# Patient Record
Sex: FEMALE | Race: BLACK OR AFRICAN AMERICAN | Employment: FULL TIME | URBAN - METROPOLITAN AREA
[De-identification: names, ages, dates, MRNs, and addresses within clinical notes are randomized per-mention and may not be internally consistent; named-entity substitution may affect disease eponyms.]

---

## 2017-01-19 ENCOUNTER — OFFICE VISIT (OUTPATIENT)
Dept: FAMILY MEDICINE CLINIC | Facility: CLINIC | Age: 32
End: 2017-01-19

## 2017-01-19 VITALS
SYSTOLIC BLOOD PRESSURE: 126 MMHG | BODY MASS INDEX: 25.83 KG/M2 | TEMPERATURE: 96.8 F | RESPIRATION RATE: 14 BRPM | HEART RATE: 92 BPM | WEIGHT: 155 LBS | DIASTOLIC BLOOD PRESSURE: 93 MMHG | HEIGHT: 65 IN

## 2017-01-19 DIAGNOSIS — J06.9 VIRAL UPPER RESPIRATORY TRACT INFECTION: Primary | ICD-10-CM

## 2017-01-19 DIAGNOSIS — R05.9 COUGH: ICD-10-CM

## 2017-01-19 RX ORDER — PSEUDOEPHEDRINE HCL 120 MG/1
120 TABLET, FILM COATED, EXTENDED RELEASE ORAL 2 TIMES DAILY
Qty: 14 TAB | Refills: 0 | Status: SHIPPED | OUTPATIENT
Start: 2017-01-19 | End: 2017-01-26

## 2017-01-19 NOTE — PROGRESS NOTES
HISTORY OF PRESENT ILLNESS  Mayra Mcclure is a 33 y.o. female. HPI Comments: Acute care visit with c/o coryza and cough x 1 week. Reports ill contacts. She has a h/o allergic rhinitis and has used her flonase and zyrtec at home. She has also tried some supportive treatment at home which seemed to help but she feels like she is getting worse. Planning some out of town trips in the next 2 weeks and wanted to see if she needs to be treated today. Nasal Congestion    The history is provided by the patient. This is a new problem. The current episode started more than 1 week ago. The problem has not changed since onset. There has been no fever. The patient is experiencing no pain. Associated symptoms include sinus pressure, cough and rhinorrhea. Pertinent negatives include no chills and no sore throat. She has tried drinking and nasal steroids for the symptoms. The treatment provided mild relief. Review of Systems   Constitutional: Negative for chills. HENT: Positive for rhinorrhea and sinus pressure. Negative for sore throat. Respiratory: Positive for cough and sputum production. Cardiovascular: Negative. Genitourinary: Negative. Past Medical History   Diagnosis Date    Asthma     Gallbladder polyp 2011    Migraine      Past Surgical History   Procedure Laterality Date    Hx acl reconstruction       Dr Ocampo Narrow Hx  section       Current Outpatient Prescriptions on File Prior to Visit   Medication Sig Dispense Refill    fluticasone (FLONASE) 50 mcg/actuation nasal spray 2 Sprays by Both Nostrils route daily. Indications: ALLERGIC RHINITIS 3 Bottle 3    hydrocortisone (CORTAID) 1 % topical cream Apply  to affected area two (2) times daily as needed for Skin Irritation. 85.2 g 3    Cetirizine (ZYRTEC) 10 mg cap Take  by mouth.  levonorgestrel (MIRENA) 20 mcg/24 hour (5 years) IUD 1 Each by IntraUTERine route once.        No current facility-administered medications on file prior to visit. Allergies and Intolerances: Allergies   Allergen Reactions    Tylox [Oxycodone-Acetaminophen] Itching       Family History:   Family History   Problem Relation Age of Onset    Asthma Mother     Thyroid Disease Mother     Diabetes Father     Hypertension Father     Headache Father     Migraines Father     Bipolar Disorder Father     Psychiatric Disorder Father     Diabetes Paternal Grandmother     Alcohol abuse Paternal Grandfather        Social History:   She  reports that she has never smoked. She has never used smokeless tobacco. She  reports that she drinks alcohol. Vitals:   Visit Vitals    BP (!) 126/93 (BP 1 Location: Left arm, BP Patient Position: Sitting)    Pulse 92    Temp 96.8 °F (36 °C) (Oral)    Resp 14    Ht 5' 5\" (1.651 m)    Wt 155 lb (70.3 kg)    BMI 25.79 kg/m2     Body surface area is 1.8 meters squared. Physical Exam   Constitutional: She is oriented to person, place, and time. She appears well-developed and well-nourished. HENT:   Head: Atraumatic. Cardiovascular: Normal rate. Pulmonary/Chest: Effort normal and breath sounds normal. No respiratory distress. She has no wheezes. Neurological: She is alert and oriented to person, place, and time. Skin: Skin is warm. Psychiatric: She has a normal mood and affect. Her behavior is normal.   Nursing note and vitals reviewed. ASSESSMENT and PLAN    ICD-10-CM ICD-9-CM    1. Viral upper respiratory tract infection J06.9 465.9 pseudoephedrine CR (SUDAFED 12 HOUR) 120 mg CR tablet    B97.89      saline rinses, continue Flonase and Zyrtec. 2. Cough R05 786.2 pseudoephedrine CR (SUDAFED 12 HOUR) 120 mg CR tablet     Follow-up Disposition:  Return if symptoms worsen or fail to improve. reviewed medications and side effects in detail    - Alarm signals discussed. ER precautions  - Plan of care reviewed with patient.   Understanding verbalized and they are in agreement with plan of care.

## 2017-01-19 NOTE — MR AVS SNAPSHOT
Visit Information Date & Time Provider Department Dept. Phone Encounter #  
 1/19/2017  9:00 AM Memory Kortney, MARITZA [employer] [phone] [id number] Follow-up Instructions Return if symptoms worsen or fail to improve. Upcoming Health Maintenance Date Due  
 PAP AKA CERVICAL CYTOLOGY 2/14/2015 INFLUENZA AGE 9 TO ADULT 8/1/2016 DTaP/Tdap/Td series (2 - Td) 8/21/2022 Allergies as of 1/19/2017  Review Complete On: 1/19/2017 By: Renae Allred LPN Severity Noted Reaction Type Reactions Tylox [Oxycodone-acetaminophen] Medium 08/11/2012   Systemic Itching Current Immunizations  Reviewed on 1/11/2016 Name Date Influenza Vaccine 9/26/2013 Tdap 8/21/2012 Not reviewed this visit You Were Diagnosed With   
  
 Codes Comments Viral upper respiratory tract infection    -  Primary ICD-10-CM: J06.9, B97.89 ICD-9-CM: 465.9 Cough     ICD-10-CM: R05 ICD-9-CM: 197. 2 Vitals BP Pulse Temp Resp Height(growth percentile) Weight(growth percentile) (!) 126/93 (BP 1 Location: Left arm, BP Patient Position: Sitting) 92 96.8 °F (36 °C) (Oral) 14 5' 5\" (1.651 m) 155 lb (70.3 kg) BMI OB Status Smoking Status 25.79 kg/m2 IUD Never Smoker Vitals History BMI and BSA Data Body Mass Index Body Surface Area 25.79 kg/m 2 1.8 m 2 Preferred Pharmacy Pharmacy Name Phone [address], [place] [address] [id number] [facility] [phone] Your Updated Medication List  
  
   
This list is accurate as of: 1/19/17  9:23 AM.  Always use your most recent med list.  
  
  
  
  
 fluticasone 50 mcg/actuation nasal spray Commonly known as:  Domnick Means 2 Sprays by Both Nostrils route daily.  Indications: ALLERGIC RHINITIS  
  
 hydrocortisone 1 % topical cream  
Commonly known as:  CORTAID  
 Apply  to affected area two (2) times daily as needed for Skin Irritation. MIRENA 20 mcg/24 hr (5 years) IUD Generic drug:  levonorgestrel 1 Each by IntraUTERine route once. pseudoephedrine  mg CR tablet Commonly known as:  SUDAFED 12 HOUR Take 1 Tab by mouth two (2) times a day for 7 days. ZyrTEC 10 mg Cap Generic drug:  Cetirizine Take  by mouth. Prescriptions Sent to Pharmacy Refills  
 pseudoephedrine CR (SUDAFED 12 HOUR) 120 mg CR tablet 0 Sig: Take 1 Tab by mouth two (2) times a day for 7 days. Class: Normal  
 Pharmacy: [address] [place] [address] #: [phone] Route: Oral  
  
Follow-up Instructions Return if symptoms worsen or fail to improve. Patient Instructions Viral Respiratory Infection: Care Instructions Your Care Instructions Viruses are very small organisms. They grow in number after they enter your body. There are many types that cause different illnesses, such as colds and the mumps. The symptoms of a viral respiratory infection often start quickly. They include a fever, sore throat, and runny nose. You may also just not feel well. Or you may not want to eat much. Most viral respiratory infections are not serious. They usually get better with time and self-care. Antibiotics are not used to treat a viral infection. That's because antibiotics will not help cure a viral illness. In some cases, antiviral medicine can help your body fight a serious viral infection. Follow-up care is a key part of your treatment and safety. Be sure to make and go to all appointments, and call your doctor if you are having problems. It's also a good idea to know your test results and keep a list of the medicines you take. How can you care for yourself at home? · Rest as much as possible until you feel better. · Be safe with medicines. Take your medicine exactly as prescribed. Call your doctor if you think you are having a problem with your medicine. You will get more details on the specific medicine your doctor prescribes. · Take an over-the-counter pain medicine, such as acetaminophen (Tylenol), ibuprofen (Advil, Motrin), or naproxen (Aleve), as needed for pain and fever. Read and follow all instructions on the label. Do not give aspirin to anyone younger than 20. It has been linked to Reye syndrome, a serious illness. · Drink plenty of fluids, enough so that your urine is light yellow or clear like water. Hot fluids, such as tea or soup, may help relieve congestion in your nose and throat. If you have kidney, heart, or liver disease and have to limit fluids, talk with your doctor before you increase the amount of fluids you drink. · Try to clear mucus from your lungs by breathing deeply and coughing. · Gargle with warm salt water once an hour. This can help reduce swelling and throat pain. Use 1 teaspoon of salt mixed in 1 cup of warm water. · Do not smoke or allow others to smoke around you. If you need help quitting, talk to your doctor about stop-smoking programs and medicines. These can increase your chances of quitting for good. To avoid spreading the virus · Cough or sneeze into a tissue. Then throw the tissue away. · If you don't have a tissue, use your hand to cover your cough or sneeze. Then clean your hand. You can also cough into your sleeve. · Wash your hands often. Use soap and warm water. Wash for 15 to 20 seconds each time. · If you don't have soap and water near you, you can clean your hands with alcohol wipes or gel. When should you call for help? Call your doctor now or seek immediate medical care if: 
· You have a new or higher fever. · Your fever lasts more than 48 hours. · You have trouble breathing. · You have a fever with a stiff neck or a severe headache. · You are sensitive to light. · You feel very sleepy or confused. Watch closely for changes in your health, and be sure to contact your doctor if: 
· You do not get better as expected. Where can you learn more? Go to http://yue-oswaldo.info/. Enter O409 in the search box to learn more about \"Viral Respiratory Infection: Care Instructions. \" Current as of: June 30, 2016 Content Version: 11.1 © 6577-1534 [employer]. Care instructions adapted under license by [employer] (which disclaims liability or warranty for this information). If you have questions about a medical condition or this instruction, always ask your healthcare professional. [address] any warranty or liability for your use of this information. Introducing [facility]! [facility] introduces weartolook patient portal. Now you can access parts of your medical record, email your doctor's office, and request medication refills online. 1. In your internet browser, go to [email or web address] 2. Click on the First Time User? Click Here link in the Sign In box. You will see the New Member Sign Up page. 3. Enter your weartolook Access Code exactly as it appears below. You will not need to use this code after youve completed the sign-up process. If you do not sign up before the expiration date, you must request a new code. · weartolook Access Code: [email or web address] Expires: 2/28/2017  9:17 AM 
 
4. Enter the last four digits of your Social Security Number (xxxx) and Date of Birth (mm/dd/yyyy) as indicated and click Submit. You will be taken to the next sign-up page. 5. Create a weartolook ID. This will be your weartolook login ID and cannot be changed, so think of one that is secure and easy to remember. 6. Create a weartolook password. You can change your password at any time. 7. Enter your Password Reset Question and Answer.  This can be used at a later time if you forget your password. 8. Enter your e-mail address. You will receive e-mail notification when new information is available in [address] 9. Click Sign Up. You can now view and download portions of your medical record. 10. Click the Download Summary menu link to download a portable copy of your medical information. If you have questions, please visit the Frequently Asked Questions section of the loanDepot website. Remember, loanDepot is NOT to be used for urgent needs. For medical emergencies, dial 911. Now available from your iPhone and Android! Please provide this summary of care documentation to your next provider. Your primary care clinician is listed as Jaden Cole. If you have any questions after today's visit, please call [phone].

## 2017-01-19 NOTE — PATIENT INSTRUCTIONS
Viral Respiratory Infection: Care Instructions  Your Care Instructions  Viruses are very small organisms. They grow in number after they enter your body. There are many types that cause different illnesses, such as colds and the mumps. The symptoms of a viral respiratory infection often start quickly. They include a fever, sore throat, and runny nose. You may also just not feel well. Or you may not want to eat much. Most viral respiratory infections are not serious. They usually get better with time and self-care. Antibiotics are not used to treat a viral infection. That's because antibiotics will not help cure a viral illness. In some cases, antiviral medicine can help your body fight a serious viral infection. Follow-up care is a key part of your treatment and safety. Be sure to make and go to all appointments, and call your doctor if you are having problems. It's also a good idea to know your test results and keep a list of the medicines you take. How can you care for yourself at home? · Rest as much as possible until you feel better. · Be safe with medicines. Take your medicine exactly as prescribed. Call your doctor if you think you are having a problem with your medicine. You will get more details on the specific medicine your doctor prescribes. · Take an over-the-counter pain medicine, such as acetaminophen (Tylenol), ibuprofen (Advil, Motrin), or naproxen (Aleve), as needed for pain and fever. Read and follow all instructions on the label. Do not give aspirin to anyone younger than 20. It has been linked to Reye syndrome, a serious illness. · Drink plenty of fluids, enough so that your urine is light yellow or clear like water. Hot fluids, such as tea or soup, may help relieve congestion in your nose and throat. If you have kidney, heart, or liver disease and have to limit fluids, talk with your doctor before you increase the amount of fluids you drink.   · Try to clear mucus from your lungs by breathing deeply and coughing. · Gargle with warm salt water once an hour. This can help reduce swelling and throat pain. Use 1 teaspoon of salt mixed in 1 cup of warm water. · Do not smoke or allow others to smoke around you. If you need help quitting, talk to your doctor about stop-smoking programs and medicines. These can increase your chances of quitting for good. To avoid spreading the virus  · Cough or sneeze into a tissue. Then throw the tissue away. · If you don't have a tissue, use your hand to cover your cough or sneeze. Then clean your hand. You can also cough into your sleeve. · Wash your hands often. Use soap and warm water. Wash for 15 to 20 seconds each time. · If you don't have soap and water near you, you can clean your hands with alcohol wipes or gel. When should you call for help? Call your doctor now or seek immediate medical care if:  · You have a new or higher fever. · Your fever lasts more than 48 hours. · You have trouble breathing. · You have a fever with a stiff neck or a severe headache. · You are sensitive to light. · You feel very sleepy or confused. Watch closely for changes in your health, and be sure to contact your doctor if:  · You do not get better as expected. Where can you learn more? Go to http://yue-oswaldo.info/. Enter L264 in the search box to learn more about \"Viral Respiratory Infection: Care Instructions. \"  Current as of: June 30, 2016  Content Version: 11.1  © 7193-5922 [employer]. Care instructions adapted under license by [employer] (which disclaims liability or warranty for this information). If you have questions about a medical condition or this instruction, always ask your healthcare professional. [address] any warranty or liability for your use of this information.

## 2017-02-17 ENCOUNTER — OFFICE VISIT (OUTPATIENT)
Dept: FAMILY MEDICINE CLINIC | Facility: CLINIC | Age: 32
End: 2017-02-17

## 2017-02-17 VITALS
OXYGEN SATURATION: 96 % | SYSTOLIC BLOOD PRESSURE: 111 MMHG | BODY MASS INDEX: 26.16 KG/M2 | DIASTOLIC BLOOD PRESSURE: 78 MMHG | HEIGHT: 65 IN | HEART RATE: 86 BPM | WEIGHT: 157 LBS | RESPIRATION RATE: 14 BRPM | TEMPERATURE: 97.7 F

## 2017-02-17 DIAGNOSIS — J06.9 VIRAL URI WITH COUGH: Primary | ICD-10-CM

## 2017-02-17 DIAGNOSIS — R10.9 FLANK PAIN: ICD-10-CM

## 2017-02-17 LAB
BILIRUB UR QL STRIP: NEGATIVE
GLUCOSE UR-MCNC: NEGATIVE MG/DL
KETONES P FAST UR STRIP-MCNC: NEGATIVE MG/DL
PH UR STRIP: 5.5 [PH] (ref 4.6–8)
PROT UR QL STRIP: NEGATIVE MG/DL
S PYO AG THROAT QL: NEGATIVE
SP GR UR STRIP: 1.02 (ref 1–1.03)
UA UROBILINOGEN AMB POC: NORMAL (ref 0.2–1)
URINALYSIS CLARITY POC: CLEAR
URINALYSIS COLOR POC: YELLOW
URINE BLOOD POC: NORMAL
URINE LEUKOCYTES POC: NEGATIVE
URINE NITRITES POC: NEGATIVE
VALID INTERNAL CONTROL?: YES

## 2017-02-17 RX ORDER — CODEINE PHOSPHATE AND GUAIFENESIN 10; 100 MG/5ML; MG/5ML
5 SOLUTION ORAL
Qty: 120 ML | Refills: 0 | Status: SHIPPED | OUTPATIENT
Start: 2017-02-17 | End: 2017-03-13

## 2017-02-17 RX ORDER — BENZONATATE 100 MG/1
100 CAPSULE ORAL
Qty: 40 CAP | Refills: 2 | Status: SHIPPED | OUTPATIENT
Start: 2017-02-17 | End: 2017-03-13

## 2017-02-17 RX ORDER — GUAIFENESIN, PSEUDOEPHEDRINE HYDROCHLORIDE 600; 60 MG/1; MG/1
1 TABLET, EXTENDED RELEASE ORAL EVERY 12 HOURS
Qty: 30 TAB | Refills: 1 | Status: SHIPPED | OUTPATIENT
Start: 2017-02-17 | End: 2017-03-13

## 2017-02-17 NOTE — PATIENT INSTRUCTIONS
Kidney Stone: Care Instructions  Your Care Instructions    Kidney stones are formed when salts, minerals, and other substances normally found in the urine clump together. They can be as small as grains of sand or, rarely, as large as golf balls. While the stone is traveling through the ureter, which is the tube that carries urine from the kidney to the bladder, you will probably feel pain. The pain may be mild or very severe. You may also have some blood in your urine. As soon as the stone reaches the bladder, any intense pain should go away. If a stone is too large to pass on its own, you may need a medical procedure to help you pass the stone. The doctor has checked you carefully, but problems can develop later. If you notice any problems or new symptoms, get medical treatment right away. Follow-up care is a key part of your treatment and safety. Be sure to make and go to all appointments, and call your doctor if you are having problems. It's also a good idea to know your test results and keep a list of the medicines you take. How can you care for yourself at home? · Drink plenty of fluids, enough so that your urine is light yellow or clear like water. If you have kidney, heart, or liver disease and have to limit fluids, talk with your doctor before you increase the amount of fluids you drink. · Take pain medicines exactly as directed. Call your doctor if you think you are having a problem with your medicine. ¨ If the doctor gave you a prescription medicine for pain, take it as prescribed. ¨ If you are not taking a prescription pain medicine, ask your doctor if you can take an over-the-counter medicine. Read and follow all instructions on the label. · Your doctor may ask you to strain your urine so that you can collect your kidney stone when it passes. You can use a kitchen strainer or a tea strainer to catch the stone. Store it in a plastic bag until you see your doctor again.   Preventing future kidney stones  Some changes in your diet may help prevent kidney stones. Depending on the cause of your stones, your doctor may recommend that you:  · Drink plenty of fluids, enough so that your urine is light yellow or clear like water. If you have kidney, heart, or liver disease and have to limit fluids, talk with your doctor before you increase the amount of fluids you drink. · Limit coffee, tea, and alcohol. Also avoid grapefruit juice. · Do not take more than the recommended daily dose of vitamins C and D.  · Avoid antacids such as Gaviscon, Maalox, Mylanta, or Tums. · Limit the amount of salt (sodium) in your diet. · Eat a balanced diet that is not too high in protein. · Limit foods that are high in a substance called oxalate, which can cause kidney stones. These foods include dark green vegetables, rhubarb, chocolate, wheat bran, nuts, cranberries, and beans. When should you call for help? Call your doctor now or seek immediate medical care if:  · You cannot keep down fluids. · Your pain gets worse. · You have a fever or chills. · You have new or worse pain in your back just below your rib cage (the flank area). · You have new or more blood in your urine. Watch closely for changes in your health, and be sure to contact your doctor if:  · You do not get better as expected. Where can you learn more? Go to http://yue-oswaldo.info/. Enter U344 in the search box to learn more about \"Kidney Stone: Care Instructions. \"  Current as of: November 20, 2015  Content Version: 11.1  © 4212-2501 [employer] ([employer]). Care instructions adapted under license by [employer] (which disclaims liability or warranty for this information). If you have questions about a medical condition or this instruction, always ask your healthcare professional. [address] any warranty or liability for your use of this information.

## 2017-02-17 NOTE — MR AVS SNAPSHOT
Visit Information Date & Time Provider Department Dept. Phone Encounter #  
 2/17/2017 11:30 AM Bernardino Wooten MD [facility] [phone] [id number] Follow-up Instructions Return if symptoms worsen or fail to improve. Upcoming Health Maintenance Date Due  
 PAP AKA CERVICAL CYTOLOGY 2/14/2015 INFLUENZA AGE 9 TO ADULT 8/1/2016 DTaP/Tdap/Td series (2 - Td) 8/21/2022 Allergies as of 2/17/2017  Review Complete On: 2/17/2017 By: Bernardino Wooten MD  
  
 Severity Noted Reaction Type Reactions Tylox [Oxycodone-acetaminophen] Medium 08/11/2012   Systemic Itching Current Immunizations  Reviewed on 1/11/2016 Name Date Influenza Vaccine 9/26/2013 Tdap 8/21/2012 Not reviewed this visit You Were Diagnosed With   
  
 Codes Comments Flank pain    -  Primary ICD-10-CM: R10.9 ICD-9-CM: 789.09 Family history of strep sore throat     ICD-10-CM: Z83.6 ICD-9-CM: V18.8 Sore throat     ICD-10-CM: J02.9 ICD-9-CM: 077 Viral URI with cough     ICD-10-CM: J06.9, B97.89 ICD-9-CM: 465.9 Vitals BP Pulse Temp Resp Height(growth percentile) Weight(growth percentile) 111/78 (BP 1 Location: Left arm, BP Patient Position: Sitting) 86 97.7 °F (36.5 °C) (Oral) 14 5' 5\" (1.651 m) 157 lb (71.2 kg) SpO2 BMI OB Status Smoking Status 96% 26.13 kg/m2 IUD Never Smoker Vitals History BMI and BSA Data Body Mass Index Body Surface Area  
 26.13 kg/m 2 1.81 m 2 Preferred Pharmacy Pharmacy Name Phone [address], [address] [place] [address] 22 [phone] [facility] [phone] Your Updated Medication List  
  
   
This list is accurate as of: 2/17/17 12:54 PM.  Always use your most recent med list.  
  
  
  
  
 benzonatate 100 mg capsule Commonly known as:  TESSALON Take 1 Cap by mouth every four (4) hours as needed for Cough.  Indications: COUGH  
  
 fluticasone 50 mcg/actuation nasal spray Commonly known as:  [facility] 2 Sprays by Both Nostrils route daily. Indications: ALLERGIC RHINITIS  
  
 guaiFENesin-codeine 100-10 mg/5 mL solution Commonly known as:  ROBITUSSIN AC Take 5 mL by mouth four (4) times daily as needed for Cough. Max Daily Amount: 20 mL. Indications: COUGH  
  
 hydrocortisone 1 % topical cream  
Commonly known as:  CORTAID Apply  to affected area two (2) times daily as needed for Skin Irritation. MIRENA 20 mcg/24 hr (5 years) IUD Generic drug:  levonorgestrel 1 Each by IntraUTERine route once. PSEUDOEPHEDRINE-guaiFENesin  mg per tablet Commonly known as:  Geroldine Shield D Take 1 Tab by mouth every twelve (12) hours. Indications: COLD SYMPTOMS ZyrTEC 10 mg Cap Generic drug:  Cetirizine Take  by mouth. Prescriptions Printed Refills  
 guaiFENesin-codeine (ROBITUSSIN AC) 100-10 mg/5 mL solution 0 Sig: Take 5 mL by mouth four (4) times daily as needed for Cough. Max Daily Amount: 20 mL. Indications: COUGH Class: Print Route: Oral  
  
Prescriptions Sent to Pharmacy Refills  
 benzonatate (TESSALON) 100 mg capsule 2 Sig: Take 1 Cap by mouth every four (4) hours as needed for Cough. Indications: COUGH Class: Normal  
 Pharmacy: [address] [place] [address] Ph #: [phone] Route: Oral  
 PSEUDOEPHEDRINE-guaiFENesin (MUCINEX D)  mg per tablet 1 Sig: Take 1 Tab by mouth every twelve (12) hours. Indications: COLD SYMPTOMS Class: Normal  
 Pharmacy: [address] [place] [address] Ph #: [phone] Route: Oral  
  
We Performed the Following AMB POC RAPID STREP A [64103 CPT(R)] AMB POC URINALYSIS DIP STICK AUTO W/O MICRO [39065 CPT(R)] CULTURE, URINE [id number] CPT(R)] REFERRAL TO UROLOGY [VVB191 Custom] Comments: Please evaluate for recurrent RLQ/right flank pain for the past month - treated twice with antibiotics for UTI, though her urinary symptoms are slight. Culture pending. Follow-up Instructions Return if symptoms worsen or fail to improve. Referral Information Referral ID Referred By Referred To  
  
 [id number] Rosalva Escalona MD   
   [facility] [address] [place], [address] [address] Phone: [phone] Fax: [phone] Visits Status Start Date End Date 1 New Request 2/17/17 2/17/18 If your referral has a status of pending review or denied, additional information will be sent to support the outcome of this decision. Patient Instructions Kidney Stone: Care Instructions Your Care Instructions Kidney stones are formed when salts, minerals, and other substances normally found in the urine clump together. They can be as small as grains of sand or, rarely, as large as golf balls. While the stone is traveling through the ureter, which is the tube that carries urine from the kidney to the bladder, you will probably feel pain. The pain may be mild or very severe. You may also have some blood in your urine. As soon as the stone reaches the bladder, any intense pain should go away. If a stone is too large to pass on its own, you may need a medical procedure to help you pass the stone. The doctor has checked you carefully, but problems can develop later. If you notice any problems or new symptoms, get medical treatment right away. Follow-up care is a key part of your treatment and safety. Be sure to make and go to all appointments, and call your doctor if you are having problems. It's also a good idea to know your test results and keep a list of the medicines you take. How can you care for yourself at home?  
· Drink plenty of fluids, enough so that your urine is light yellow or clear like water. If you have kidney, heart, or liver disease and have to limit fluids, talk with your doctor before you increase the amount of fluids you drink. · Take pain medicines exactly as directed. Call your doctor if you think you are having a problem with your medicine. ¨ If the doctor gave you a prescription medicine for pain, take it as prescribed. ¨ If you are not taking a prescription pain medicine, ask your doctor if you can take an over-the-counter medicine. Read and follow all instructions on the label. · Your doctor may ask you to strain your urine so that you can collect your kidney stone when it passes. You can use a kitchen strainer or a tea strainer to catch the stone. Store it in a plastic bag until you see your doctor again. Preventing future kidney stones Some changes in your diet may help prevent kidney stones. Depending on the cause of your stones, your doctor may recommend that you: · Drink plenty of fluids, enough so that your urine is light yellow or clear like water. If you have kidney, heart, or liver disease and have to limit fluids, talk with your doctor before you increase the amount of fluids you drink. · Limit coffee, tea, and alcohol. Also avoid grapefruit juice. · Do not take more than the recommended daily dose of vitamins C and D. 
· Avoid antacids such as Gaviscon, Maalox, Mylanta, or Tums. · Limit the amount of salt (sodium) in your diet. · Eat a balanced diet that is not too high in protein. · Limit foods that are high in a substance called oxalate, which can cause kidney stones. These foods include dark green vegetables, rhubarb, chocolate, wheat bran, nuts, cranberries, and beans. When should you call for help? Call your doctor now or seek immediate medical care if: 
· You cannot keep down fluids. · Your pain gets worse. · You have a fever or chills. · You have new or worse pain in your back just below your rib cage (the flank area). · You have new or more blood in your urine. Watch closely for changes in your health, and be sure to contact your doctor if: 
· You do not get better as expected. Where can you learn more? Go to http://yue-oswaldo.info/. Enter N487 in the search box to learn more about \"Kidney Stone: Care Instructions. \" Current as of: November 20, 2015 Content Version: 11.1 © 9879-3375 [employer]. Care instructions adapted under license by [employer] (which disclaims liability or warranty for this information). If you have questions about a medical condition or this instruction, always ask your healthcare professional. [address] any warranty or liability for your use of this information. Introducing [facility] & HEALTH SERVICES! Raad Peña introduces [employer] patient portal. Now you can access parts of your medical record, email your doctor's office, and request medication refills online. 1. In your internet browser, go to [email or web address] 2. Click on the First Time User? Click Here link in the Sign In box. You will see the New Member Sign Up page. 3. Enter your [employer] Access Code exactly as it appears below. You will not need to use this code after youve completed the sign-up process. If you do not sign up before the expiration date, you must request a new code. · [employer] Access Code: [email or web address] Expires: 2/28/2017  9:17 AM 
 
4. Enter the last four digits of your Social Security Number (xxxx) and Date of Birth (mm/dd/yyyy) as indicated and click Submit. You will be taken to the next sign-up page. 5. Create a Jibestreamt ID. This will be your [employer] login ID and cannot be changed, so think of one that is secure and easy to remember. 6. Create a [employer] password. You can change your password at any time. 7. Enter your Password Reset Question and Answer. This can be used at a later time if you forget your password. 8. Enter your e-mail address. You will receive e-mail notification when new information is available in [address] 9. Click Sign Up. You can now view and download portions of your medical record. 10. Click the Download Summary menu link to download a portable copy of your medical information. If you have questions, please visit the Frequently Asked Questions section of the [employer] website. Remember, [employer] is NOT to be used for urgent needs. For medical emergencies, dial 911. Now available from your iPhone and Android! Please provide this summary of care documentation to your next provider. Your primary care clinician is listed as Robbin Blackburn. If you have any questions after today's visit, please call [phone].

## 2017-02-17 NOTE — PROGRESS NOTES
HISTORY OF PRESENT ILLNESS  Fozia Rios is a 33 y.o. female. HPI Comments: Presents with 2 issues. Her cold symptoms are as noted, and her allergy medications aren't helping. She has several ill contacts, and her children had strep throat 2 weeks. She has also been having right flank/RLQ pain intermittently over the past month, with very minimal urinary symptoms. She has been evaluated at [facility] twice for this, with a positive culture - first with Macrobid for 7 days, then with Bactrim DS for 10 days. Both worked, but her symptoms came back within a few days. She hasn't noticed any hematuria. Cold Symptoms   The history is provided by the patient. This is a new problem. Episode onset: 4 days ago. The problem occurs constantly. The problem has been gradually worsening. The cough is non-productive. Patient reports a subjective fever - was not measured. Associated symptoms include chills, eye redness (right), headaches (mild), rhinorrhea, sore throat and myalgias. Pertinent negatives include no chest pain, no sweats, no ear congestion, no ear pain, no shortness of breath, no wheezing, no nausea and no vomiting. Treatments tried: allergy meds. The treatment provided mild relief. She is not a smoker. Her past medical history is significant for asthma (possibly in childhood). Abdominal Pain   The history is provided by the patient. This is a new problem. Episode onset: 3-4 weeks ago. The problem occurs every several days. The problem has not changed since onset. Associated symptoms include abdominal pain and headaches (mild). Pertinent negatives include no chest pain and no shortness of breath. Nothing aggravates the symptoms. Nothing relieves the symptoms. Review of Systems   Constitutional: Positive for chills. HENT: Positive for congestion, rhinorrhea and sore throat. Negative for ear pain. Eyes: Positive for redness (right). Respiratory: Negative for shortness of breath and wheezing. Cardiovascular: Negative for chest pain. Gastrointestinal: Positive for abdominal pain. Negative for nausea and vomiting. Genitourinary: Positive for frequency and urgency. Negative for dysuria. Musculoskeletal: Positive for myalgias. Neurological: Positive for headaches (mild). Physical Exam   Constitutional: She is oriented to person, place, and time. She appears well-developed and well-nourished. No distress. Neck: Neck supple. No thyromegaly present. Cardiovascular: Normal rate and regular rhythm. Exam reveals no gallop and no friction rub. No murmur heard. Pulmonary/Chest: Effort normal and breath sounds normal. No respiratory distress. Abdominal: Soft. She exhibits no mass. There is no tenderness. There is no CVA tenderness. Lymphadenopathy:     She has no cervical adenopathy. Neurological: She is alert and oriented to person, place, and time. Skin: Skin is warm and dry. Psychiatric: She has a normal mood and affect.  Her behavior is normal. Judgment and thought content normal.       Recent Results (from the past 12 hour(s))   AMB POC RAPID STREP A    Collection Time: 02/17/17 12:03 PM   Result Value Ref Range    VALID INTERNAL CONTROL POC Yes     Group A Strep Ag Negative Negative   AMB POC URINALYSIS DIP STICK AUTO W/O MICRO    Collection Time: 02/17/17 12:05 PM   Result Value Ref Range    Color (UA POC) Yellow     Clarity (UA POC) Clear     Glucose (UA POC) Negative Negative    Bilirubin (UA POC) Negative Negative    Ketones (UA POC) Negative Negative    Specific gravity (UA POC) 1.025 1.001 - 1.035    Blood (UA POC) 1+ Negative    pH (UA POC) 5.5 4.6 - 8.0    Protein (UA POC) Negative Negative mg/dL    Urobilinogen (UA POC) 0.2 mg/dL 0.2 - 1    Nitrites (UA POC) Negative Negative    Leukocyte esterase (UA POC) Negative Negative       ASSESSMENT and PLAN    ICD-10-CM ICD-9-CM    1. Viral URI with cough J06.9 465.9 AMB POC RAPID STREP A    B97.89  guaiFENesin-codeine (ROBITUSSIN AC) 100-10 mg/5 mL solution      benzonatate (TESSALON) 100 mg capsule      PSEUDOEPHEDRINE-guaiFENesin (MUCINEX D)  mg per tablet   2. Flank pain R10.9 789.09 AMB POC URINALYSIS DIP STICK AUTO W/O MICRO      CULTURE, URINE      REFERRAL TO UROLOGY     Follow-up Disposition:  Return if symptoms worsen or fail to improve. the following changes in treatment are made: Symptomatic treatment for her cold symptoms. Urine culture obtained - will treat based on this result, but refer to Urology for further evaluation. She may need a work-up for renal stones or obstruction, leading to recurrent infection. lab results and schedule of future lab studies reviewed with patient  reviewed medications and side effects in detail  Plan of care reviewed - patient verbalize(s) understanding and agreement.

## 2017-03-13 ENCOUNTER — OFFICE VISIT (OUTPATIENT)
Dept: FAMILY MEDICINE CLINIC | Facility: CLINIC | Age: 32
End: 2017-03-13

## 2017-03-13 VITALS
HEART RATE: 93 BPM | WEIGHT: 156 LBS | RESPIRATION RATE: 16 BRPM | HEIGHT: 65 IN | TEMPERATURE: 98.7 F | DIASTOLIC BLOOD PRESSURE: 64 MMHG | SYSTOLIC BLOOD PRESSURE: 107 MMHG | OXYGEN SATURATION: 99 % | BODY MASS INDEX: 25.99 KG/M2

## 2017-03-13 DIAGNOSIS — J11.1 INFLUENZA: Primary | ICD-10-CM

## 2017-03-13 LAB
FLUAV+FLUBV AG NOSE QL IA.RAPID: NEGATIVE POS/NEG
FLUAV+FLUBV AG NOSE QL IA.RAPID: POSITIVE POS/NEG
VALID INTERNAL CONTROL?: YES

## 2017-03-13 RX ORDER — CODEINE PHOSPHATE AND GUAIFENESIN 10; 100 MG/5ML; MG/5ML
5 SOLUTION ORAL
Qty: 120 ML | Refills: 0 | Status: SHIPPED | OUTPATIENT
Start: 2017-03-13 | End: 2017-03-22

## 2017-03-13 RX ORDER — OSELTAMIVIR PHOSPHATE 75 MG/1
75 CAPSULE ORAL 2 TIMES DAILY
Qty: 10 CAP | Refills: 0 | Status: SHIPPED | OUTPATIENT
Start: 2017-03-13 | End: 2017-03-18

## 2017-03-13 NOTE — PROGRESS NOTES
HISTORY OF PRESENT ILLNESS  Erin Nielsen is a 33 y.o. female. HPI Comments: Presents with 2 day history of scratchy sore throat, with nasal congestion. Her throat is much more sore now, with cough and anterior pleuritic chest pain, and sputum production. No known ill contacts. She did take some Tessalon, which helped a little bit. She has had some chills last night, but hasn't measured her temperature. No flu shot this year. Nasal Congestion    Associated symptoms include chills, congestion, ear pain, sore throat, cough, headaches and chest pain (pleuritic). Sore Throat    Associated symptoms include congestion, ear pain, headaches and cough. Pertinent negatives include no diarrhea and no vomiting. Past Medical History:   Diagnosis Date    Asthma     Gallbladder polyp 4/12/2011    Migraine        Past Surgical History:   Procedure Laterality Date    HX ACL RECONSTRUCTION  7-2010    Dr Jeanne Lopez         History   Smoking Status    Never Smoker   Smokeless Tobacco    Never Used     Current Outpatient Prescriptions   Medication Sig    fluticasone (FLONASE) 50 mcg/actuation nasal spray 2 Sprays by Both Nostrils route daily. Indications: ALLERGIC RHINITIS    hydrocortisone (CORTAID) 1 % topical cream Apply  to affected area two (2) times daily as needed for Skin Irritation.  Cetirizine (ZYRTEC) 10 mg cap Take  by mouth.  levonorgestrel (MIRENA) 20 mcg/24 hour (5 years) IUD 1 Each by IntraUTERine route once. No current facility-administered medications for this visit. Review of Systems   Constitutional: Positive for chills and malaise/fatigue. HENT: Positive for congestion, ear pain and sore throat. Respiratory: Positive for cough and sputum production. Cardiovascular: Positive for chest pain (pleuritic). Gastrointestinal: Positive for nausea. Negative for diarrhea and vomiting. Musculoskeletal: Positive for myalgias (mild).    Neurological: Positive for headaches. Visit Vitals    /64    Pulse 93    Temp 98.7 °F (37.1 °C)    Resp 16    Ht 5' 5\" (1.651 m)    Wt 156 lb (70.8 kg)    SpO2 99%    BMI 25.96 kg/m2       Physical Exam   Constitutional: She is oriented to person, place, and time. She appears well-developed and well-nourished. No distress. Appears ill   HENT:   Right Ear: Tympanic membrane, external ear and ear canal normal.   Left Ear: Tympanic membrane, external ear and ear canal normal.   Nose: Mucosal edema present. Mouth/Throat: Posterior oropharyngeal erythema present. No oropharyngeal exudate or posterior oropharyngeal edema. Neck: Neck supple. No thyromegaly present. Cardiovascular: Normal rate and regular rhythm. Exam reveals no gallop and no friction rub. No murmur heard. Pulmonary/Chest: Effort normal and breath sounds normal. No respiratory distress. She has no wheezes. She has no rales. Lymphadenopathy:     She has no cervical adenopathy. Neurological: She is alert and oriented to person, place, and time. Skin: Skin is warm and dry. Psychiatric: She has a normal mood and affect. Her behavior is normal. Judgment and thought content normal.     Recent Results (from the past 12 hour(s))   AMB POC JESÚS INFLUENZA A/B TEST    Collection Time: 03/13/17 11:03 AM   Result Value Ref Range    VALID INTERNAL CONTROL POC Yes     Influenza A Ag POC Negative Negative Pos/Neg    Influenza B Ag POC Positive Negative Pos/Neg       ASSESSMENT and PLAN    ICD-10-CM ICD-9-CM    1. Influenza J11.1 487.1 AMB POC JESÚS INFLUENZA A/B TEST      oseltamivir (TAMIFLU) 75 mg capsule      guaiFENesin-codeine (ROBITUSSIN AC) 100-10 mg/5 mL solution     Follow-up Disposition:  Return if symptoms worsen or fail to improve.  lab results and schedule of future lab studies reviewed with patient - will treat for flu as noted. reviewed medications and side effects in detail  Encouraged to get flu shot in future.   Plan of care reviewed - patient verbalize(s) understanding and agreement.

## 2017-03-13 NOTE — MR AVS SNAPSHOT
Visit Information Date & Time Provider Department Dept. Phone Encounter #  
 3/13/2017 10:00 AM Steffen Paniagua MD [facility] [phone] [id number] Follow-up Instructions Return if symptoms worsen or fail to improve. Your Appointments 3/20/2017  9:30 AM  
New Patient with MD KRISTAL [facility]) Appt Note: flank pain/Mailed paperwork [address] [id number] [phone] [address] [facility] [address] Upcoming Health Maintenance Date Due  
 PAP AKA CERVICAL CYTOLOGY 2/14/2015 DTaP/Tdap/Td series (2 - Td) 8/21/2022 Allergies as of 3/13/2017  Review Complete On: 3/13/2017 By: Steffen Paniagua MD  
  
 Severity Noted Reaction Type Reactions Tylox [Oxycodone-acetaminophen] Medium 08/11/2012   Systemic Itching Current Immunizations  Reviewed on 1/11/2016 Name Date Influenza Vaccine 9/26/2013 Tdap 8/21/2012 Not reviewed this visit You Were Diagnosed With   
  
 Codes Comments Influenza    -  Primary ICD-10-CM: J11.1 ICD-9-CM: 487. 1 Vitals BP Pulse Temp Resp Height(growth percentile) Weight(growth percentile) 107/64 93 98.7 °F (37.1 °C) 16 5' 5\" (1.651 m) 156 lb (70.8 kg) SpO2 BMI OB Status Smoking Status 99% 25.96 kg/m2 IUD Never Smoker Vitals History BMI and BSA Data Body Mass Index Body Surface Area  
 25.96 kg/m 2 1.8 m 2 Preferred Pharmacy Pharmacy Name Phone [address], [address] 17 [facility] 22 2938 [facility] [phone] Your Updated Medication List  
  
   
This list is accurate as of: 3/13/17 11:12 AM.  Always use your most recent med list.  
  
  
  
  
 fluticasone 50 mcg/actuation nasal spray Commonly known as:  Michaelyn Drought 2 Sprays by Both Nostrils route daily. Indications: ALLERGIC RHINITIS guaiFENesin-codeine 100-10 mg/5 mL solution Commonly known as:  ROBITUSSIN AC Take 5 mL by mouth four (4) times daily as needed for Cough. Max Daily Amount: 20 mL. Indications: COUGH  
  
 hydrocortisone 1 % topical cream  
Commonly known as:  CORTAID Apply  to affected area two (2) times daily as needed for Skin Irritation. MIRENA 20 mcg/24 hr (5 years) IUD Generic drug:  levonorgestrel 1 Each by IntraUTERine route once. oseltamivir 75 mg capsule Commonly known as:  TAMIFLU Take 1 Cap by mouth two (2) times a day for 5 days. Indications: INFLUENZA ZyrTEC 10 mg Cap Generic drug:  Cetirizine Take  by mouth. Prescriptions Printed Refills  
 guaiFENesin-codeine (ROBITUSSIN AC) 100-10 mg/5 mL solution 0 Sig: Take 5 mL by mouth four (4) times daily as needed for Cough. Max Daily Amount: 20 mL. Indications: COUGH Class: Print Route: Oral  
  
Prescriptions Sent to Pharmacy Refills  
 oseltamivir (TAMIFLU) 75 mg capsule 0 Sig: Take 1 Cap by mouth two (2) times a day for 5 days. Indications: INFLUENZA Class: Normal  
 Pharmacy: [address] [place] [address] #: [phone] Route: Oral  
  
We Performed the Following AMB POC JESÚS INFLUENZA A/B TEST [37197 CPT(R)] Follow-up Instructions Return if symptoms worsen or fail to improve. Introducing [facility] & HEALTH SERVICES! Jesica Alvarez introduces [email or web address] patient portal. Now you can access parts of your medical record, email your doctor's office, and request medication refills online. 1. In your internet browser, go to [email or web address] 2. Click on the First Time User? Click Here link in the Sign In box. You will see the New Member Sign Up page. 3. Enter your [email or web address] Access Code exactly as it appears below. You will not need to use this code after youve completed the sign-up process.  If you do not sign up before the expiration date, you must request a new code. · [employer] Access Code: [email or web address] Expires: 6/11/2017 11:12 AM 
 
4. Enter the last four digits of your Social Security Number (xxxx) and Date of Birth (mm/dd/yyyy) as indicated and click Submit. You will be taken to the next sign-up page. 5. Create a [employer] ID. This will be your [employer] login ID and cannot be changed, so think of one that is secure and easy to remember. 6. Create a [employer] password. You can change your password at any time. 7. Enter your Password Reset Question and Answer. This can be used at a later time if you forget your password. 8. Enter your e-mail address. You will receive e-mail notification when new information is available in [address] 9. Click Sign Up. You can now view and download portions of your medical record. 10. Click the Download Summary menu link to download a portable copy of your medical information. If you have questions, please visit the Frequently Asked Questions section of the [employer] website. Remember, [employer] is NOT to be used for urgent needs. For medical emergencies, dial 911. Now available from your iPhone and Android! Please provide this summary of care documentation to your next provider. Your primary care clinician is listed as Steffen Paniagua. If you have any questions after today's visit, please call [phone].

## 2017-03-13 NOTE — LETTER
3/13/2017 11:13 AM 
 
Ms. Jeanette Trivedi [address] [address] To Whom It May Concern: 
 
 
Jeanette Trivedi is under the care of [facility] for influenza. She should be excused from work/school from March 13, 2017 to March 17, 2017. She may return to work/school after this without restrictions. If there are questions or concerns, please have the patient contact our office. Sincerely, Star Mcallister MD

## 2017-03-22 ENCOUNTER — OFFICE VISIT (OUTPATIENT)
Dept: FAMILY MEDICINE CLINIC | Facility: CLINIC | Age: 32
End: 2017-03-22

## 2017-03-22 VITALS
TEMPERATURE: 97.3 F | OXYGEN SATURATION: 98 % | RESPIRATION RATE: 16 BRPM | HEART RATE: 83 BPM | HEIGHT: 65 IN | SYSTOLIC BLOOD PRESSURE: 137 MMHG | BODY MASS INDEX: 25.66 KG/M2 | DIASTOLIC BLOOD PRESSURE: 84 MMHG | WEIGHT: 154 LBS

## 2017-03-22 DIAGNOSIS — J11.1 INFLUENZA: ICD-10-CM

## 2017-03-22 DIAGNOSIS — N30.01 ACUTE CYSTITIS WITH HEMATURIA: Primary | ICD-10-CM

## 2017-03-22 LAB
BILIRUB UR QL STRIP: NEGATIVE
GLUCOSE UR-MCNC: NEGATIVE MG/DL
KETONES P FAST UR STRIP-MCNC: NEGATIVE MG/DL
PH UR STRIP: 5 [PH] (ref 4.6–8)
PROT UR QL STRIP: NEGATIVE MG/DL
SP GR UR STRIP: 1.02 (ref 1–1.03)
UA UROBILINOGEN AMB POC: NORMAL (ref 0.2–1)
URINALYSIS CLARITY POC: CLEAR
URINALYSIS COLOR POC: YELLOW
URINE BLOOD POC: NORMAL
URINE LEUKOCYTES POC: NEGATIVE
URINE NITRITES POC: NEGATIVE

## 2017-03-22 RX ORDER — FLUCONAZOLE 150 MG/1
150 TABLET ORAL DAILY
Qty: 1 TAB | Refills: 1 | Status: SHIPPED | OUTPATIENT
Start: 2017-03-22 | End: 2017-04-28

## 2017-03-22 RX ORDER — CIPROFLOXACIN 250 MG/1
250 TABLET, FILM COATED ORAL EVERY 12 HOURS
Qty: 6 TAB | Refills: 0 | Status: SHIPPED | OUTPATIENT
Start: 2017-03-22 | End: 2017-04-28

## 2017-03-22 RX ORDER — HYDROCODONE POLISTIREX AND CHLORPHENIRAMINE POLISTIREX 10; 8 MG/5ML; MG/5ML
5 SUSPENSION, EXTENDED RELEASE ORAL
Qty: 100 ML | Refills: 0 | Status: SHIPPED | OUTPATIENT
Start: 2017-03-22 | End: 2017-04-28

## 2017-03-22 NOTE — PROGRESS NOTES
HISTORY OF PRESENT ILLNESS  Cyndi Pérez is a 33 y.o. female. HPI Comments: Presents with about 3 days of urinary frequency and cloudy urine, without dysuria or gross hematuria. Because of her influenza, she had to reschedule her Urology evaluation for frequent UTI's. She has finished Tamiflu, but she still feels pretty bad (cough, sometimes productive, headaches, some chills, nasal congestion that is worse at night). She didn't think the Robitussin AC was all that effective, but she has finished it. Urinary Frequency    Associated symptoms include chills and frequency. Pertinent negatives include no nausea and no vomiting. Cold Symptoms   Associated symptoms include chest pain (anterior pleuritic), chills and headaches. Pertinent negatives include no sore throat, no myalgias, no shortness of breath, no nausea and no vomiting. Past Medical History:   Diagnosis Date    Asthma     Gallbladder polyp 4/12/2011    Migraine        Past Surgical History:   Procedure Laterality Date    HX ACL RECONSTRUCTION  7-2010    Dr Wilkes Gave         History   Smoking Status    Never Smoker   Smokeless Tobacco    Never Used     Current Outpatient Prescriptions   Medication Sig    fluticasone (FLONASE) 50 mcg/actuation nasal spray 2 Sprays by Both Nostrils route daily. Indications: ALLERGIC RHINITIS    hydrocortisone (CORTAID) 1 % topical cream Apply  to affected area two (2) times daily as needed for Skin Irritation.  Cetirizine (ZYRTEC) 10 mg cap Take  by mouth.  levonorgestrel (MIRENA) 20 mcg/24 hour (5 years) IUD 1 Each by IntraUTERine route once. No current facility-administered medications for this visit. Review of Systems   Constitutional: Positive for chills and malaise/fatigue. HENT: Positive for congestion. Negative for sore throat. Respiratory: Positive for cough and sputum production. Negative for shortness of breath.     Cardiovascular: Positive for chest pain (anterior pleuritic). Gastrointestinal: Negative for nausea and vomiting. Genitourinary: Positive for frequency. Negative for dysuria. Musculoskeletal: Negative for myalgias. Neurological: Positive for headaches. Visit Vitals    /84    Pulse 83    Temp 97.3 °F (36.3 °C)    Resp 16    Ht 5' 5\" (1.651 m)    Wt 154 lb (69.9 kg)    SpO2 98%    BMI 25.63 kg/m2       Physical Exam   Constitutional: She is oriented to person, place, and time. She appears well-developed and well-nourished. No distress. HENT:   Right Ear: Tympanic membrane, external ear and ear canal normal.   Left Ear: Tympanic membrane, external ear and ear canal normal.   Nose: Mucosal edema present. Mouth/Throat: Oropharynx is clear and moist.   Neck: Neck supple. No thyromegaly present. Cardiovascular: Normal rate and regular rhythm. Exam reveals no gallop and no friction rub. No murmur heard. Pulmonary/Chest: Effort normal and breath sounds normal. No respiratory distress. She has no wheezes. She has no rales. Abdominal: Soft. There is no tenderness. There is no CVA tenderness. Musculoskeletal: She exhibits no edema. Lymphadenopathy:     She has no cervical adenopathy. Neurological: She is alert and oriented to person, place, and time. Skin: Skin is warm and dry. Psychiatric: She has a normal mood and affect.  Her behavior is normal. Judgment and thought content normal.       Recent Results (from the past 12 hour(s))   AMB POC URINALYSIS DIP STICK AUTO W/O MICRO    Collection Time: 03/22/17  8:46 AM   Result Value Ref Range    Color (UA POC) Yellow     Clarity (UA POC) Clear     Glucose (UA POC) Negative Negative    Bilirubin (UA POC) Negative Negative    Ketones (UA POC) Negative Negative    Specific gravity (UA POC) 1.020 1.001 - 1.035    Blood (UA POC) 2+ Negative    pH (UA POC) 5.0 4.6 - 8.0    Protein (UA POC) Negative Negative mg/dL    Urobilinogen (UA POC) 0.2 mg/dL 0.2 - 1    Nitrites (UA POC) Negative Negative    Leukocyte esterase (UA POC) Negative Negative       ASSESSMENT and PLAN    ICD-10-CM ICD-9-CM    1. Acute cystitis with hematuria N30.01 595.0 AMB POC URINALYSIS DIP STICK AUTO W/O MICRO      ciprofloxacin HCl (CIPRO) 250 mg tablet      fluconazole (DIFLUCAN) 150 mg tablet   2. Influenza J11.1 487.1 chlorpheniramine-HYDROcodone (TUSSIONEX) 10-8 mg/5 mL suspension     Follow-up Disposition:  Return if symptoms worsen or fail to improve. the following changes in treatment are made: Will treat for UTI with Cipro (Diflucan prn). Tussionex prn for her residual flu symptoms - no evidence of complications  lab results and schedule of future lab studies reviewed with patient  reviewed medications and side effects in detail  She will reschedule Urology. Plan of care reviewed - patient verbalize(s) understanding and agreement.

## 2017-03-22 NOTE — LETTER
3/22/2017 10:10 AM 
 
 
Ms. Monserrat Richards [address] [facility] [address] Ms. Brandon Gibson was seen in the office today. She may need some additional time out of work for her influenza. Sincerely, Ayden Martinez MD

## 2017-03-22 NOTE — MR AVS SNAPSHOT
Visit Information Date & Time Provider Department Dept. Phone Encounter #  
 3/22/2017  9:30 AM Lizabeth Boyce MD [facility] [phone] [id number] Follow-up Instructions Return if symptoms worsen or fail to improve. Upcoming Health Maintenance Date Due  
 PAP AKA CERVICAL CYTOLOGY 2/14/2015 DTaP/Tdap/Td series (2 - Td) 8/21/2022 Allergies as of 3/22/2017  Review Complete On: 3/22/2017 By: Lizabeth Boyce MD  
  
 Severity Noted Reaction Type Reactions Tylox [Oxycodone-acetaminophen] Medium 08/11/2012   Systemic Itching Current Immunizations  Reviewed on 1/11/2016 Name Date Influenza Vaccine 9/26/2013 Tdap 8/21/2012 Not reviewed this visit You Were Diagnosed With   
  
 Codes Comments Acute cystitis with hematuria    -  Primary ICD-10-CM: N30.01 
ICD-9-CM: 595.0 Influenza     ICD-10-CM: J11.1 ICD-9-CM: 487. 1 Vitals BP Pulse Temp Resp Height(growth percentile) Weight(growth percentile) 137/84 83 97.3 °F (36.3 °C) 16 5' 5\" (1.651 m) 154 lb (69.9 kg) SpO2 BMI OB Status Smoking Status 98% 25.63 kg/m2 IUD Never Smoker Vitals History BMI and BSA Data Body Mass Index Body Surface Area  
 25.63 kg/m 2 1.79 m 2 Preferred Pharmacy Pharmacy Name Phone [address], [address] [place] [address] [facility] [phone] Your Updated Medication List  
  
   
This list is accurate as of: 3/22/17 10:11 AM.  Always use your most recent med list.  
  
  
  
  
 chlorpheniramine-HYDROcodone 10-8 mg/5 mL suspension Commonly known as:  Elizabeth Flint Take 5 mL by mouth every twelve (12) hours as needed for Cough. Max Daily Amount: 10 mL. Indications: COUGH  
  
 ciprofloxacin HCl 250 mg tablet Commonly known as:  CIPRO Take 1 Tab by mouth every twelve (12) hours. fluconazole 150 mg tablet Commonly known as:  DIFLUCAN  
 Take 1 Tab by mouth daily. fluticasone 50 mcg/actuation nasal spray Commonly known as:  Imagene Poot 2 Sprays by Both Nostrils route daily. Indications: ALLERGIC RHINITIS  
  
 hydrocortisone 1 % topical cream  
Commonly known as:  CORTAID Apply  to affected area two (2) times daily as needed for Skin Irritation. MIRENA 20 mcg/24 hr (5 years) IUD Generic drug:  levonorgestrel 1 Each by IntraUTERine route once. ZyrTEC 10 mg Cap Generic drug:  Cetirizine Take  by mouth. Prescriptions Printed Refills  
 chlorpheniramine-HYDROcodone (TUSSIONEX) 10-8 mg/5 mL suspension 0 Sig: Take 5 mL by mouth every twelve (12) hours as needed for Cough. Max Daily Amount: 10 mL. Indications: COUGH Class: Print Route: Oral  
  
Prescriptions Sent to Pharmacy Refills  
 ciprofloxacin HCl (CIPRO) 250 mg tablet 0 Sig: Take 1 Tab by mouth every twelve (12) hours. Class: Normal  
 Pharmacy: [address] [place] [address] Ph #: [phone] Route: Oral  
 fluconazole (DIFLUCAN) 150 mg tablet 1 Sig: Take 1 Tab by mouth daily. Class: Normal  
 Pharmacy: [address] [place] [address] Ph #: [phone] Route: Oral  
  
We Performed the Following AMB POC URINALYSIS DIP STICK AUTO W/O MICRO [76966 CPT(R)] Follow-up Instructions Return if symptoms worsen or fail to improve. Introducing [facility]! [address] [facility] introduces [employer] patient portal. Now you can access parts of your medical record, email your doctor's office, and request medication refills online. 1. In your internet browser, go to [email or web address] 2. Click on the First Time User? Click Here link in the Sign In box. You will see the New Member Sign Up page. 3. Enter your [employer] Access Code exactly as it appears below.  You will not need to use this code after youve completed the sign-up process. If you do not sign up before the expiration date, you must request a new code. · [employer] Access Code: [email or web address] Expires: 6/11/2017 11:12 AM 
 
4. Enter the last four digits of your Social Security Number (xxxx) and Date of Birth (mm/dd/yyyy) as indicated and click Submit. You will be taken to the next sign-up page. 5. Create a [employer] ID. This will be your [employer] login ID and cannot be changed, so think of one that is secure and easy to remember. 6. Create a [employer] password. You can change your password at any time. 7. Enter your Password Reset Question and Answer. This can be used at a later time if you forget your password. 8. Enter your e-mail address. You will receive e-mail notification when new information is available in [address] 9. Click Sign Up. You can now view and download portions of your medical record. 10. Click the Download Summary menu link to download a portable copy of your medical information. If you have questions, please visit the Frequently Asked Questions section of the [employer] website. Remember, [employer] is NOT to be used for urgent needs. For medical emergencies, dial 911. Now available from your iPhone and Android! Please provide this summary of care documentation to your next provider. Your primary care clinician is listed as Jayne Pereira. If you have any questions after today's visit, please call [phone].

## 2017-04-28 ENCOUNTER — OFFICE VISIT (OUTPATIENT)
Dept: FAMILY MEDICINE CLINIC | Facility: CLINIC | Age: 32
End: 2017-04-28

## 2017-04-28 VITALS
BODY MASS INDEX: 25.66 KG/M2 | HEIGHT: 65 IN | SYSTOLIC BLOOD PRESSURE: 119 MMHG | OXYGEN SATURATION: 99 % | HEART RATE: 78 BPM | DIASTOLIC BLOOD PRESSURE: 73 MMHG | TEMPERATURE: 97.4 F | RESPIRATION RATE: 16 BRPM | WEIGHT: 154 LBS

## 2017-04-28 DIAGNOSIS — J02.9 SORE THROAT: ICD-10-CM

## 2017-04-28 DIAGNOSIS — K52.9 GASTROENTERITIS: Primary | ICD-10-CM

## 2017-04-28 LAB
S PYO AG THROAT QL: NEGATIVE
VALID INTERNAL CONTROL?: YES

## 2017-04-28 RX ORDER — DIPHENOXYLATE HYDROCHLORIDE AND ATROPINE SULFATE 2.5; .025 MG/1; MG/1
1 TABLET ORAL
Qty: 16 TAB | Refills: 0 | Status: SHIPPED | OUTPATIENT
Start: 2017-04-28 | End: 2017-06-30

## 2017-04-28 RX ORDER — ONDANSETRON 8 MG/1
8 TABLET, ORALLY DISINTEGRATING ORAL
Qty: 30 TAB | Refills: 0 | Status: SHIPPED | OUTPATIENT
Start: 2017-04-28 | End: 2017-06-30

## 2017-04-28 NOTE — PROGRESS NOTES
HISTORY OF PRESENT ILLNESS  Shellia Duane is a 33 y.o. female. HPI Comments: Acute care visit with c/o nausea, vomiting, abdominal pain and diarrhea which began more than 2 days ago. Reports similar symptoms in her twin girls. Reports chills and myalgia. She has also noted some sore throat. She continues to have nausea when she tries to eat solid food. She has not tried any treatment for this at home. Vomiting    The history is provided by the patient. This is a new problem. The current episode started more than 2 days ago. The problem occurs 2 to 4 times per day. The problem has been gradually improving. There has been no fever. Associated symptoms include chills, abdominal pain, diarrhea and myalgias. The patient is not pregnant. Risk factors include suspect food intake and ill contacts. Nausea    The history is provided by the patient. This is a new problem. The current episode started more than 2 days ago. The problem occurs 2 to 4 times per day. The problem has been gradually improving. There has been no fever. Associated symptoms include chills, abdominal pain, diarrhea and myalgias. The patient is not pregnant. Risk factors include suspect food intake and ill contacts. Abdominal Pain   The history is provided by the patient. This is a new problem. The current episode started more than 2 days ago. The problem occurs daily. The problem has not changed since onset. Associated symptoms include abdominal pain. She has tried nothing for the symptoms. Review of Systems   Constitutional: Positive for chills. Gastrointestinal: Positive for abdominal pain, diarrhea, nausea and vomiting. Musculoskeletal: Positive for myalgias.      Past Medical History:   Diagnosis Date    Asthma     Gallbladder polyp 4/12/2011    Migraine      Past Surgical History:   Procedure Laterality Date    HX ACL RECONSTRUCTION  7-2010    Dr Maddie Soares Prescriptions on File Prior to Visit   Medication Sig Dispense Refill    fluticasone (FLONASE) 50 mcg/actuation nasal spray 2 Sprays by Both Nostrils route daily. Indications: ALLERGIC RHINITIS 3 Bottle 3    Cetirizine (ZYRTEC) 10 mg cap Take  by mouth.  levonorgestrel (MIRENA) 20 mcg/24 hour (5 years) IUD 1 Each by IntraUTERine route once.  hydrocortisone (CORTAID) 1 % topical cream Apply  to affected area two (2) times daily as needed for Skin Irritation. 85.2 g 3     No current facility-administered medications on file prior to visit. Allergies and Intolerances: Allergies   Allergen Reactions    Tylox [Oxycodone-Acetaminophen] Itching       Family History:   Family History   Problem Relation Age of Onset    Asthma Mother     Thyroid Disease Mother     Diabetes Father     Hypertension Father     Headache Father     Migraines Father     Bipolar Disorder Father     Psychiatric Disorder Father     Diabetes Paternal Grandmother     Alcohol abuse Paternal Grandfather        Social History:   She  reports that she has never smoked. She has never used smokeless tobacco. She  reports that she drinks alcohol. Vitals:   Visit Vitals    /73    Pulse 78    Temp 97.4 °F (36.3 °C)    Resp 16    Ht 5' 5\" (1.651 m)    Wt 154 lb (69.9 kg)    SpO2 99%    BMI 25.63 kg/m2     Body surface area is 1.79 meters squared. Recent Results (from the past 24 hour(s))   AMB POC RAPID STREP A    Collection Time: 04/28/17 11:10 AM   Result Value Ref Range    VALID INTERNAL CONTROL POC Yes     Group A Strep Ag Negative Negative       Physical Exam   Constitutional: She is oriented to person, place, and time. She appears well-developed and well-nourished. HENT:   Head: Atraumatic. Cardiovascular: Normal rate. Pulmonary/Chest: Effort normal and breath sounds normal.   Abdominal: Soft. There is tenderness in the left lower quadrant. Neurological: She is alert and oriented to person, place, and time.    Skin: Skin is warm.   Psychiatric: She has a normal mood and affect. Her behavior is normal.   Nursing note and vitals reviewed. ASSESSMENT and PLAN    ICD-10-CM ICD-9-CM    1. Gastroenteritis K52.9 558.9 diphenoxylate-atropine (LOMOTIL) 2.5-0.025 mg per tablet      ondansetron (ZOFRAN ODT) 8 mg disintegrating tablet   2. Sore throat J02.9 462 AMB POC RAPID STREP A     Follow-up Disposition:  Return if symptoms worsen or fail to improve.  lab results and schedule of future lab studies reviewed with patient  reviewed medications and side effects in detail  Drink plenty of fluids to stay hydrated. Bowel rest. Clear liquid diet, advance diet as tolerated. - Alarm signals discussed. ER precautions  - Plan of care reviewed with patient. Understanding verbalized and they are in agreement with plan of care.

## 2017-04-28 NOTE — MR AVS SNAPSHOT
Visit Information Date & Time Provider Department Dept. Phone Encounter #  
 4/28/2017 10:15 AM Florence Jeans, NP [employer] [phone] Follow-up Instructions Return if symptoms worsen or fail to improve. Upcoming Health Maintenance Date Due  
 PAP AKA CERVICAL CYTOLOGY 2/14/2015 DTaP/Tdap/Td series (2 - Td) 8/21/2022 Allergies as of 4/28/2017  Review Complete On: 4/28/2017 By: Florence Jeans, NP Severity Noted Reaction Type Reactions Tylox [Oxycodone-acetaminophen] Medium 08/11/2012   Systemic Itching Current Immunizations  Reviewed on 1/11/2016 Name Date Influenza Vaccine 9/26/2013 Tdap 8/21/2012 Not reviewed this visit You Were Diagnosed With   
  
 Codes Comments Gastroenteritis    -  Primary ICD-10-CM: K52.9 ICD-9-CM: 558.9 Sore throat     ICD-10-CM: J02.9 ICD-9-CM: 088 Vitals BP Pulse Temp Resp Height(growth percentile) Weight(growth percentile) 119/73 78 97.4 °F (36.3 °C) 16 5' 5\" (1.651 m) 154 lb (69.9 kg) SpO2 BMI OB Status Smoking Status 99% 25.63 kg/m2 IUD Never Smoker Vitals History BMI and BSA Data Body Mass Index Body Surface Area  
 25.63 kg/m 2 1.79 m 2 Preferred Pharmacy Pharmacy Name Phone [address], [address] [place] [address] [facility] [phone] Your Updated Medication List  
  
   
This list is accurate as of: 4/28/17 11:10 AM.  Always use your most recent med list.  
  
  
  
  
 diphenoxylate-atropine 2.5-0.025 mg per tablet Commonly known as:  LOMOTIL Take 1 Tab by mouth four (4) times daily as needed for Diarrhea. Max Daily Amount: 4 Tabs. fluticasone 50 mcg/actuation nasal spray Commonly known as:  Katherne Fix 2 Sprays by Both Nostrils route daily.  Indications: ALLERGIC RHINITIS  
  
 hydrocortisone 1 % topical cream  
Commonly known as:  CORTAID  
 Apply  to affected area two (2) times daily as needed for Skin Irritation. MIRENA 20 mcg/24 hr (5 years) IUD Generic drug:  levonorgestrel 1 Each by IntraUTERine route once. ondansetron 8 mg disintegrating tablet Commonly known as:  ZOFRAN ODT Take 1 Tab by mouth every eight (8) hours as needed for Nausea. ZyrTEC 10 mg Cap Generic drug:  Cetirizine Take  by mouth. Prescriptions Printed Refills diphenoxylate-atropine (LOMOTIL) 2.5-0.025 mg per tablet 0 Sig: Take 1 Tab by mouth four (4) times daily as needed for Diarrhea. Max Daily Amount: 4 Tabs. Class: Print Route: Oral  
  
Prescriptions Sent to Pharmacy Refills  
 ondansetron (ZOFRAN ODT) 8 mg disintegrating tablet 0 Sig: Take 1 Tab by mouth every eight (8) hours as needed for Nausea. Class: Normal  
 Pharmacy: [address] [place] [address] #: [phone] Route: Oral  
  
We Performed the Following AMB POC RAPID STREP A [20112 CPT(R)] Follow-up Instructions Return if symptoms worsen or fail to improve. Patient Instructions Food Poisoning: Care Instructions Your Care Instructions Food poisoning occurs when you eat foods that contain harmful germs. Food can be contaminated while it is growing, during processing, or when it is prepared. Fresh fruits and vegetables also can be contaminated if they are washed in contaminated water. You may have become ill after eating undercooked meat or eggs or other unsafe foods. Cooking foods thoroughly and storing them properly can help prevent food poisoning. There are many types of food poisoning. Your symptoms depend on the type of food poisoning you have. You will probably begin to feel better in 1 or 2 days. In the meantime, get plenty of rest and make sure that you do not become dehydrated. Follow-up care is a key part of your treatment and safety. Be sure to make and go to all appointments, and call your doctor if you are having problems. Its also a good idea to know your test results and keep a list of the medicines you take. How can you care for yourself at home? · To prevent dehydration, drink plenty of fluids, enough so that your urine is light yellow or clear like water. Choose water and other caffeine-free clear liquids until you feel better. If you have kidney, heart, or liver disease and have to limit fluids, talk with your doctor before you increase the amount of fluids you drink. · Begin eating small amounts of mild, low-fat foods, depending on how you feel. Try foods like rice, dry crackers, bananas, and applesauce. ¨ Avoid spicy foods, alcohol, and coffee until 48 hours after all symptoms have disappeared. ¨ Avoid chewing gum that contains sorbitol. ¨ Avoid dairy products for 3 days after symptoms disappear. · Take your medicines as prescribed. Call your doctor if you think you are having a problem with your medicine. You will get more details on the specific medicines your doctor prescribes. To prevent food poisoning · Keep hot foods hot and cold foods cold. · Do not eat meats, dressings, salads, or other foods that have been kept at room temperature for more than 2 hours. · Use a thermometer to check your refrigerator. It should be between 34°F and 40°F. 
· Defrost meats in the refrigerator or microwave, not on the kitchen counter. · Keep your hands and your kitchen clean. Wash your hands, cutting boards, and countertops with hot, soapy water. If you use the same cutting board for chopping vegetables and preparing raw meat, be sure to wash the cutting board with hot, soapy water between each use. · Cook meat until it is well done. · Do not eat raw eggs or uncooked dough or sauces made with raw eggs. · Do not take chances. If you think food looks or tastes spoiled, throw it out. When should you call for help? Call 911 anytime you think you may need emergency care. For example, call if: 
· You have sudden, severe belly pain. · You passed out (lost consciousness). · You vomit blood or what looks like coffee grounds. · You pass maroon or very bloody stools. Call your doctor now or seek immediate medical care if: 
· You have signs of needing more fluids. You have sunken eyes and a dry mouth, and you pass only a little dark urine. · Weakness in your legs makes it hard to stand or walk. · You have swelling in your hands, face, or feet. · You have trouble breathing. · You are dizzy or lightheaded, or you feel like you may faint. · Your stools are black and tarlike or have streaks of blood. · You have numbness and tingling in your hands or feet. · You have new nausea or vomiting. · You have new or increased belly pain. · Your joints ache. Watch closely for changes in your health, and be sure to contact your doctor if: 
· Your vomiting is not getting better after 1 to 2 days. · Your diarrhea is not getting better after 3 days. Where can you learn more? Go to http://yue-oswaldo.info/. Enter E400 in the search box to learn more about \"Food Poisoning: Care Instructions. \" Current as of: May 24, 2016 Content Version: 11.2 © 5197-7988 [employer]. Care instructions adapted under license by [employer] (which disclaims liability or warranty for this information). If you have questions about a medical condition or this instruction, always ask your healthcare professional. [place] [address] any warranty or liability for your use of this information. Introducing [facility]! Amado Puri introduces [employer] patient portal. Now you can access parts of your medical record, email your doctor's office, and request medication refills online. 1. In your internet browser, go to [email or web address] 2. Click on the First Time User? Click Here link in the Sign In box. You will see the New Member Sign Up page. 3. Enter your [facility] Access Code exactly as it appears below. You will not need to use this code after youve completed the sign-up process. If you do not sign up before the expiration date, you must request a new code. · [employer] [facility] Access Code: [email or web address] Expires: 6/11/2017 11:12 AM 
 
4. Enter the last four digits of your Social Security Number (xxxx) and Date of Birth (mm/dd/yyyy) as indicated and click Submit. You will be taken to the next sign-up page. 5. Create a [facility] ID. This will be your [facility] [facility] login ID and cannot be changed, so think of one that is secure and easy to remember. 6. Create a [facility] [facility] password. You can change your password at any time. 7. Enter your Password Reset Question and Answer. This can be used at a later time if you forget your password. 8. Enter your e-mail address. You will receive e-mail notification when new information is available in [address] 9. Click Sign Up. You can now view and download portions of your medical record. 10. Click the Download Summary menu link to download a portable copy of your medical information. If you have questions, please visit the Frequently Asked Questions section of the [employer] [facility] website. Remember, [employer] [facility] is NOT to be used for urgent needs. For medical emergencies, dial 911. Now available from your iPhone and Android! Please provide this summary of care documentation to your next provider. Your primary care clinician is listed as Bonnie Pedraza. If you have any questions after today's visit, please call [phone].

## 2017-04-28 NOTE — PATIENT INSTRUCTIONS
Food Poisoning: Care Instructions  Your Care Instructions  Food poisoning occurs when you eat foods that contain harmful germs. Food can be contaminated while it is growing, during processing, or when it is prepared. Fresh fruits and vegetables also can be contaminated if they are washed in contaminated water. You may have become ill after eating undercooked meat or eggs or other unsafe foods. Cooking foods thoroughly and storing them properly can help prevent food poisoning. There are many types of food poisoning. Your symptoms depend on the type of food poisoning you have. You will probably begin to feel better in 1 or 2 days. In the meantime, get plenty of rest and make sure that you do not become dehydrated. Follow-up care is a key part of your treatment and safety. Be sure to make and go to all appointments, and call your doctor if you are having problems. Its also a good idea to know your test results and keep a list of the medicines you take. How can you care for yourself at home? · To prevent dehydration, drink plenty of fluids, enough so that your urine is light yellow or clear like water. Choose water and other caffeine-free clear liquids until you feel better. If you have kidney, heart, or liver disease and have to limit fluids, talk with your doctor before you increase the amount of fluids you drink. · Begin eating small amounts of mild, low-fat foods, depending on how you feel. Try foods like rice, dry crackers, bananas, and applesauce. ¨ Avoid spicy foods, alcohol, and coffee until 48 hours after all symptoms have disappeared. ¨ Avoid chewing gum that contains sorbitol. ¨ Avoid dairy products for 3 days after symptoms disappear. · Take your medicines as prescribed. Call your doctor if you think you are having a problem with your medicine. You will get more details on the specific medicines your doctor prescribes. To prevent food poisoning  · Keep hot foods hot and cold foods cold.   · Do not eat meats, dressings, salads, or other foods that have been kept at room temperature for more than 2 hours. · Use a thermometer to check your refrigerator. It should be between 34°F and 40°F.  · Defrost meats in the refrigerator or microwave, not on the kitchen counter. · Keep your hands and your kitchen clean. Wash your hands, cutting boards, and countertops with hot, soapy water. If you use the same cutting board for chopping vegetables and preparing raw meat, be sure to wash the cutting board with hot, soapy water between each use. · Cook meat until it is well done. · Do not eat raw eggs or uncooked dough or sauces made with raw eggs. · Do not take chances. If you think food looks or tastes spoiled, throw it out. When should you call for help? Call 911 anytime you think you may need emergency care. For example, call if:  · You have sudden, severe belly pain. · You passed out (lost consciousness). · You vomit blood or what looks like coffee grounds. · You pass maroon or very bloody stools. Call your doctor now or seek immediate medical care if:  · You have signs of needing more fluids. You have sunken eyes and a dry mouth, and you pass only a little dark urine. · Weakness in your legs makes it hard to stand or walk. · You have swelling in your hands, face, or feet. · You have trouble breathing. · You are dizzy or lightheaded, or you feel like you may faint. · Your stools are black and tarlike or have streaks of blood. · You have numbness and tingling in your hands or feet. · You have new nausea or vomiting. · You have new or increased belly pain. · Your joints ache. Watch closely for changes in your health, and be sure to contact your doctor if:  · Your vomiting is not getting better after 1 to 2 days. · Your diarrhea is not getting better after 3 days. Where can you learn more? Go to http://yue-oswaldo.info/.   Enter K837 in the search box to learn more about \"Food Poisoning: Care Instructions. \"  Current as of: May 24, 2016  Content Version: 11.2  © 5381-5184 [employer]. Care instructions adapted under license by [employer] (which disclaims liability or warranty for this information). If you have questions about a medical condition or this instruction, always ask your healthcare professional. [place] [address] any warranty or liability for your use of this information.

## 2017-06-30 ENCOUNTER — OFFICE VISIT (OUTPATIENT)
Dept: FAMILY MEDICINE CLINIC | Facility: CLINIC | Age: 32
End: 2017-06-30

## 2017-06-30 VITALS
HEIGHT: 65 IN | TEMPERATURE: 98.2 F | DIASTOLIC BLOOD PRESSURE: 84 MMHG | BODY MASS INDEX: 23.82 KG/M2 | OXYGEN SATURATION: 99 % | WEIGHT: 143 LBS | RESPIRATION RATE: 16 BRPM | HEART RATE: 74 BPM | SYSTOLIC BLOOD PRESSURE: 115 MMHG

## 2017-06-30 DIAGNOSIS — N39.0 URINARY TRACT INFECTION WITH HEMATURIA, SITE UNSPECIFIED: ICD-10-CM

## 2017-06-30 DIAGNOSIS — R35.0 URINARY FREQUENCY: Primary | ICD-10-CM

## 2017-06-30 DIAGNOSIS — R31.9 URINARY TRACT INFECTION WITH HEMATURIA, SITE UNSPECIFIED: ICD-10-CM

## 2017-06-30 LAB
BILIRUB UR QL STRIP: NEGATIVE
GLUCOSE UR-MCNC: NEGATIVE MG/DL
KETONES P FAST UR STRIP-MCNC: NEGATIVE MG/DL
PH UR STRIP: 6.5 [PH] (ref 4.6–8)
PROT UR QL STRIP: NORMAL MG/DL
SP GR UR STRIP: 1.01 (ref 1–1.03)
UA UROBILINOGEN AMB POC: NORMAL (ref 0.2–1)
URINALYSIS CLARITY POC: NORMAL
URINALYSIS COLOR POC: YELLOW
URINE BLOOD POC: NORMAL
URINE LEUKOCYTES POC: NORMAL
URINE NITRITES POC: NEGATIVE

## 2017-06-30 RX ORDER — SULFAMETHOXAZOLE AND TRIMETHOPRIM 800; 160 MG/1; MG/1
1 TABLET ORAL 2 TIMES DAILY
Qty: 20 TAB | Refills: 0 | Status: SHIPPED | OUTPATIENT
Start: 2017-06-30 | End: 2017-07-10

## 2017-06-30 NOTE — PROGRESS NOTES
HISTORY OF PRESENT ILLNESS  Pastor Guerrero is a 33 y.o. female. HPI Comments: Acute care visit with c/o urinary frequency x 1 day. She c/o pressure while urinating. Denies any fever, chills or pain with urination. LMP: unknown, pt has IUD. Urinary Frequency    The history is provided by the patient. This is a new problem. The current episode started yesterday. The problem occurs every urination. The problem has not changed since onset. The patient is experiencing no pain. There has been no fever. She is sexually active. There is no history of pyelonephritis. Associated symptoms include frequency. The patient is not pregnant. She has tried nothing for the symptoms. Her past medical history does not include recurrent UTIs. Review of Systems   Constitutional: Negative. Respiratory: Negative. Cardiovascular: Negative. Genitourinary: Positive for frequency. Negative for dysuria. Musculoskeletal: Negative. Past Medical History:   Diagnosis Date    Asthma     Gallbladder polyp 4/12/2011    Migraine      Past Surgical History:   Procedure Laterality Date    HX ACL RECONSTRUCTION  7-2010    Dr Hicks Able Prescriptions on File Prior to Visit   Medication Sig Dispense Refill    fluticasone (FLONASE) 50 mcg/actuation nasal spray 2 Sprays by Both Nostrils route daily. Indications: ALLERGIC RHINITIS (Patient taking differently: 2 Sprays by Both Nostrils route as needed. Indications: ALLERGIC RHINITIS) 3 Bottle 3    hydrocortisone (CORTAID) 1 % topical cream Apply  to affected area two (2) times daily as needed for Skin Irritation. 85.2 g 3    Cetirizine (ZYRTEC) 10 mg cap Take  by mouth.  levonorgestrel (MIRENA) 20 mcg/24 hour (5 years) IUD 1 Each by IntraUTERine route once. No current facility-administered medications on file prior to visit. Allergies and Intolerances:    Allergies   Allergen Reactions    Tylox [Oxycodone-Acetaminophen] Itching       Family History:   Family History   Problem Relation Age of Onset   Damon Hylton Asthma Mother     Thyroid Disease Mother     Diabetes Father     Hypertension Father     Headache Father     Migraines Father     Bipolar Disorder Father     Psychiatric Disorder Father     Diabetes Paternal Grandmother     Alcohol abuse Paternal Grandfather        Social History:   She  reports that she has never smoked. She has never used smokeless tobacco. She  reports that she drinks alcohol. Vitals:   Visit Vitals    /84    Pulse 74    Temp 98.2 °F (36.8 °C)    Resp 16    Ht 5' 5\" (1.651 m)    Wt 143 lb (64.9 kg)    SpO2 99%    BMI 23.8 kg/m2     Body surface area is 1.73 meters squared. Recent Results (from the past 12 hour(s))   AMB POC URINALYSIS DIP STICK AUTO W/O MICRO    Collection Time: 06/30/17 11:12 AM   Result Value Ref Range    Color (UA POC) Yellow     Clarity (UA POC) Cloudy     Glucose (UA POC) Negative Negative    Bilirubin (UA POC) Negative Negative    Ketones (UA POC) Negative Negative    Specific gravity (UA POC) 1.015 1.001 - 1.035    Blood (UA POC) 3+ Negative    pH (UA POC) 6.5 4.6 - 8.0    Protein (UA POC) 1+ Negative mg/dL    Urobilinogen (UA POC) 0.2 mg/dL 0.2 - 1    Nitrites (UA POC) Negative Negative    Leukocyte esterase (UA POC) 3+ Negative       Physical Exam   Constitutional: She is oriented to person, place, and time. She appears well-developed and well-nourished. HENT:   Head: Atraumatic. Cardiovascular: Normal rate. Pulmonary/Chest: Effort normal.   Musculoskeletal: Normal range of motion. Neurological: She is alert and oriented to person, place, and time. Psychiatric: She has a normal mood and affect. Her behavior is normal.   Nursing note and vitals reviewed. ASSESSMENT and PLAN    ICD-10-CM ICD-9-CM    1. Urinary frequency R35.0 788.41 AMB POC URINALYSIS DIP STICK AUTO W/O MICRO   2.  Urinary tract infection with hematuria, site unspecified N39.0 599.0 CULTURE, URINE    R31.9  trimethoprim-sulfamethoxazole (BACTRIM DS, SEPTRA DS) 160-800 mg per tablet     Follow-up Disposition:  Return in about 2 weeks (around 7/14/2017), or if symptoms worsen or fail to improve, for hematuria.  lab results and schedule of future lab studies reviewed with patient  reviewed medications and side effects in detail    - Alarm signals discussed. ER precautions  - Plan of care reviewed with patient. Understanding verbalized and they are in agreement with plan of care.

## 2017-06-30 NOTE — MR AVS SNAPSHOT
Visit Information Date & Time Provider Department Dept. Phone Encounter #  
 6/30/2017 10:45 AM [facility] [phone] [id number] Follow-up Instructions Return in about 2 weeks (around 7/14/2017), or if symptoms worsen or fail to improve, for hematuria. Upcoming Health Maintenance Date Due  
 PAP AKA CERVICAL CYTOLOGY 2/14/2015 INFLUENZA AGE 9 TO ADULT 8/1/2017 DTaP/Tdap/Td series (2 - Td) 8/21/2022 Allergies as of 6/30/2017  Review Complete On: 6/30/2017 By: Irma Reynoso Severity Noted Reaction Type Reactions Tylox [Oxycodone-acetaminophen] Medium 08/11/2012   Systemic Itching Current Immunizations  Reviewed on 1/11/2016 Name Date Influenza Vaccine 9/26/2013 Tdap 8/21/2012 Not reviewed this visit You Were Diagnosed With   
  
 Codes Comments Urinary frequency    -  Primary ICD-10-CM: R35.0 ICD-9-CM: 788.41 Urinary tract infection with hematuria, site unspecified     ICD-10-CM: N39.0, R31.9 ICD-9-CM: 599.0 Vitals BP Pulse Temp Resp Height(growth percentile) Weight(growth percentile) 115/84 74 98.2 °F (36.8 °C) 16 5' 5\" (1.651 m) 143 lb (64.9 kg) SpO2 BMI OB Status Smoking Status 99% 23.8 kg/m2 IUD Never Smoker Vitals History BMI and BSA Data Body Mass Index Body Surface Area  
 23.8 kg/m 2 1.73 m 2 Preferred Pharmacy Pharmacy Name Phone [address], [address] [place] [address] [phone] [facility] [phone] Your Updated Medication List  
  
   
This list is accurate as of: 6/30/17 11:21 AM.  Always use your most recent med list.  
  
  
  
  
 fluticasone 50 mcg/actuation nasal spray Commonly known as:  Murray Petite 2 Sprays by Both Nostrils route daily.  Indications: ALLERGIC RHINITIS  
  
 hydrocortisone 1 % topical cream  
Commonly known as:  CORTAID  
 Apply  to affected area two (2) times daily as needed for Skin Irritation. MIRENA 20 mcg/24 hr (5 years) IUD Generic drug:  levonorgestrel 1 Each by IntraUTERine route once. trimethoprim-sulfamethoxazole 160-800 mg per tablet Commonly known as:  BACTRIM DS, SEPTRA DS Take 1 Tab by mouth two (2) times a day for 10 days. ZyrTEC 10 mg Cap Generic drug:  Cetirizine Take  by mouth. Prescriptions Sent to Pharmacy Refills  
 trimethoprim-sulfamethoxazole (BACTRIM DS, SEPTRA DS) 160-800 mg per tablet 0 Sig: Take 1 Tab by mouth two (2) times a day for 10 days. Class: Normal  
 Pharmacy: [address] [place] [address] #: [phone] Route: Oral  
  
We Performed the Following AMB POC URINALYSIS DIP STICK AUTO W/O MICRO [55089 CPT(R)] Follow-up Instructions Return in about 2 weeks (around 7/14/2017), or if symptoms worsen or fail to improve, for hematuria. To-Do List   
 06/30/2017 Microbiology:  CULTURE, URINE Patient Instructions Urinary Tract Infection in Women: Care Instructions Your Care Instructions A urinary tract infection, or UTI, is a general term for an infection anywhere between the kidneys and the urethra (where urine comes out). Most UTIs are bladder infections. They often cause pain or burning when you urinate. UTIs are caused by bacteria and can be cured with antibiotics. Be sure to complete your treatment so that the infection goes away. Follow-up care is a key part of your treatment and safety. Be sure to make and go to all appointments, and call your doctor if you are having problems. It's also a good idea to know your test results and keep a list of the medicines you take. How can you care for yourself at home? · Take your antibiotics as directed.  Do not stop taking them just because you feel better. You need to take the full course of antibiotics. · Drink extra water and other fluids for the next day or two. This may help wash out the bacteria that are causing the infection. (If you have kidney, heart, or liver disease and have to limit fluids, talk with your doctor before you increase your fluid intake.) · Avoid drinks that are carbonated or have caffeine. They can irritate the bladder. · Urinate often. Try to empty your bladder each time. · To relieve pain, take a hot bath or lay a heating pad set on low over your lower belly or genital area. Never go to sleep with a heating pad in place. To prevent UTIs · Drink plenty of water each day. This helps you urinate often, which clears bacteria from your system. (If you have kidney, heart, or liver disease and have to limit fluids, talk with your doctor before you increase your fluid intake.) · Urinate when you need to. · Urinate right after you have sex. · Change sanitary pads often. · Avoid douches, bubble baths, feminine hygiene sprays, and other feminine hygiene products that have deodorants. · After going to the bathroom, wipe from front to back. When should you call for help? Call your doctor now or seek immediate medical care if: · Symptoms such as fever, chills, nausea, or vomiting get worse or appear for the first time. · You have new pain in your back just below your rib cage. This is called flank pain. · There is new blood or pus in your urine. · You have any problems with your antibiotic medicine. Watch closely for changes in your health, and be sure to contact your doctor if: 
· You are not getting better after taking an antibiotic for 2 days. · Your symptoms go away but then come back. Where can you learn more? Go to http://yue-oswaldo.info/. Enter J600 in the search box to learn more about \"Urinary Tract Infection in Women: Care Instructions. \" Current as of: November 28, 2016 Content Version: 11.3 © 0012-8454 Ichiba, Redux. Care instructions adapted under license by Ustream (which disclaims liability or warranty for this information). If you have questions about a medical condition or this instruction, always ask your healthcare professional. [address] any warranty or liability for your use of this information. Introducing [facility]! Bianca Anderson introduces AppFirst patient portal. Now you can access parts of your medical record, email your doctor's office, and request medication refills online. 1. In your internet browser, go to [email or web address] 2. Click on the First Time User? Click Here link in the Sign In box. You will see the New Member Sign Up page. 3. Enter your AppFirst Access Code exactly as it appears below. You will not need to use this code after youve completed the sign-up process. If you do not sign up before the expiration date, you must request a new code. · AppFirst Access Code: [email or web address] Expires: 9/28/2017 11:17 AM 
 
4. Enter the last four digits of your Social Security Number (xxxx) and Date of Birth (mm/dd/yyyy) as indicated and click Submit. You will be taken to the next sign-up page. 5. Create a AppFirst ID. This will be your AppFirst login ID and cannot be changed, so think of one that is secure and easy to remember. 6. Create a AppFirst password. You can change your password at any time. 7. Enter your Password Reset Question and Answer. This can be used at a later time if you forget your password. 8. Enter your e-mail address. You will receive e-mail notification when new information is available in [address] 9. Click Sign Up. You can now view and download portions of your medical record. 10. Click the Download Summary menu link to download a portable copy of your medical information. If you have questions, please visit the Frequently Asked Questions section of the Recovrt website. Remember, [employer] is NOT to be used for urgent needs. For medical emergencies, dial 911. Now available from your iPhone and Android! Please provide this summary of care documentation to your next provider. Your primary care clinician is listed as Lizette Devine. If you have any questions after today's visit, please call [phone].

## 2017-06-30 NOTE — PATIENT INSTRUCTIONS
Urinary Tract Infection in Women: Care Instructions  Your Care Instructions    A urinary tract infection, or UTI, is a general term for an infection anywhere between the kidneys and the urethra (where urine comes out). Most UTIs are bladder infections. They often cause pain or burning when you urinate. UTIs are caused by bacteria and can be cured with antibiotics. Be sure to complete your treatment so that the infection goes away. Follow-up care is a key part of your treatment and safety. Be sure to make and go to all appointments, and call your doctor if you are having problems. It's also a good idea to know your test results and keep a list of the medicines you take. How can you care for yourself at home? · Take your antibiotics as directed. Do not stop taking them just because you feel better. You need to take the full course of antibiotics. · Drink extra water and other fluids for the next day or two. This may help wash out the bacteria that are causing the infection. (If you have kidney, heart, or liver disease and have to limit fluids, talk with your doctor before you increase your fluid intake.)  · Avoid drinks that are carbonated or have caffeine. They can irritate the bladder. · Urinate often. Try to empty your bladder each time. · To relieve pain, take a hot bath or lay a heating pad set on low over your lower belly or genital area. Never go to sleep with a heating pad in place. To prevent UTIs  · Drink plenty of water each day. This helps you urinate often, which clears bacteria from your system. (If you have kidney, heart, or liver disease and have to limit fluids, talk with your doctor before you increase your fluid intake.)  · Urinate when you need to. · Urinate right after you have sex. · Change sanitary pads often. · Avoid douches, bubble baths, feminine hygiene sprays, and other feminine hygiene products that have deodorants.   · After going to the bathroom, wipe from front to back.  When should you call for help? Call your doctor now or seek immediate medical care if:  · Symptoms such as fever, chills, nausea, or vomiting get worse or appear for the first time. · You have new pain in your back just below your rib cage. This is called flank pain. · There is new blood or pus in your urine. · You have any problems with your antibiotic medicine. Watch closely for changes in your health, and be sure to contact your doctor if:  · You are not getting better after taking an antibiotic for 2 days. · Your symptoms go away but then come back. Where can you learn more? Go to http://yue-oswaldo.info/. Enter N520 in the search box to learn more about \"Urinary Tract Infection in Women: Care Instructions. \"  Current as of: November 28, 2016  Content Version: 11.3  © 9023-9827 Buzzoola, Quadrant [employer]. Care instructions adapted under license by Haloband (which disclaims liability or warranty for this information). If you have questions about a medical condition or this instruction, always ask your healthcare professional. [address] any warranty or liability for your use of this information.

## 2017-07-02 LAB — CULTURE RESULT, SENTARA: ABNORMAL

## 2017-07-03 NOTE — PROGRESS NOTES
Patient made aware of abnormal lab result continue bactrim. Verified name and . Patient verbalized an understanding of results and did not voice any concerns at this time.

## 2017-07-14 ENCOUNTER — OFFICE VISIT (OUTPATIENT)
Dept: FAMILY MEDICINE CLINIC | Facility: CLINIC | Age: 32
End: 2017-07-14

## 2017-07-14 VITALS
WEIGHT: 141 LBS | RESPIRATION RATE: 16 BRPM | SYSTOLIC BLOOD PRESSURE: 110 MMHG | DIASTOLIC BLOOD PRESSURE: 82 MMHG | HEART RATE: 72 BPM | TEMPERATURE: 97.2 F | OXYGEN SATURATION: 99 % | BODY MASS INDEX: 23.49 KG/M2 | HEIGHT: 65 IN

## 2017-07-14 DIAGNOSIS — R35.0 URINARY FREQUENCY: Primary | ICD-10-CM

## 2017-07-14 DIAGNOSIS — R31.9 HEMATURIA: ICD-10-CM

## 2017-07-14 DIAGNOSIS — R80.9 PROTEINURIA, UNSPECIFIED TYPE: ICD-10-CM

## 2017-07-14 LAB
BILIRUB UR QL STRIP: NEGATIVE
GLUCOSE UR-MCNC: NEGATIVE MG/DL
KETONES P FAST UR STRIP-MCNC: NEGATIVE MG/DL
PH UR STRIP: 5.5 [PH] (ref 4.6–8)
PROT UR QL STRIP: NORMAL MG/DL
SP GR UR STRIP: 1.02 (ref 1–1.03)
UA UROBILINOGEN AMB POC: NORMAL (ref 0.2–1)
URINALYSIS CLARITY POC: CLEAR
URINALYSIS COLOR POC: YELLOW
URINE BLOOD POC: NORMAL
URINE LEUKOCYTES POC: NEGATIVE
URINE NITRITES POC: NEGATIVE

## 2017-07-14 NOTE — PROGRESS NOTES
HISTORY OF PRESENT ILLNESS  Anjel Tatum is a 33 y.o. female. HPI Comments: Follow up on hematuria. Completed antibiotics for UTI. She has not noticed any blood in her urine. Has an IUD in place and has not had a menstrual period in a while. Denies any fever, abdominal pain or pelvic pain. Follow-up   The history is provided by the patient. This is a new problem. Blood in Urine    The history is provided by the patient. This is a new problem. The current episode started more than 1 week ago. The problem has not changed since onset. The patient is experiencing no pain. There has been no fever. She is sexually active. There is no history of pyelonephritis. Associated symptoms include hematuria. The patient is not pregnant. She has tried antibiotics for the symptoms. The treatment provided no relief. Her past medical history does not include recurrent UTIs. Review of Systems   Constitutional: Negative. Respiratory: Negative. Cardiovascular: Negative. Genitourinary: Positive for hematuria. Musculoskeletal: Negative. Past Medical History:   Diagnosis Date    Asthma     Gallbladder polyp 4/12/2011    Migraine      Past Surgical History:   Procedure Laterality Date    HX ACL RECONSTRUCTION  7-2010    Dr Davon Merrill Prescriptions on File Prior to Visit   Medication Sig Dispense Refill    hydrocortisone (CORTAID) 1 % topical cream Apply  to affected area two (2) times daily as needed for Skin Irritation. 85.2 g 3    Cetirizine (ZYRTEC) 10 mg cap Take  by mouth.  levonorgestrel (MIRENA) 20 mcg/24 hour (5 years) IUD 1 Each by IntraUTERine route once.  fluticasone (FLONASE) 50 mcg/actuation nasal spray 2 Sprays by Both Nostrils route daily. Indications: ALLERGIC RHINITIS (Patient taking differently: 2 Sprays by Both Nostrils route as needed.  Indications: ALLERGIC RHINITIS) 3 Bottle 3     No current facility-administered medications on file prior to visit. Allergies and Intolerances: Allergies   Allergen Reactions    Tylox [Oxycodone-Acetaminophen] Itching       Family History:   Family History   Problem Relation Age of Onset    Asthma Mother     Thyroid Disease Mother     Diabetes Father     Hypertension Father     Headache Father     Migraines Father     Bipolar Disorder Father     Psychiatric Disorder Father     Diabetes Paternal Grandmother     Alcohol abuse Paternal Grandfather        Social History:   She  reports that she has never smoked. She has never used smokeless tobacco. She  reports that she drinks alcohol. Vitals:   Visit Vitals    /82    Pulse 72    Temp 97.2 °F (36.2 °C)    Resp 16    Ht 5' 5\" (1.651 m)    Wt 141 lb (64 kg)    SpO2 99%    BMI 23.46 kg/m2     Body surface area is 1.71 meters squared. Recent Results (from the past 12 hour(s))   AMB POC URINALYSIS DIP STICK AUTO W/O MICRO    Collection Time: 07/14/17  9:17 AM   Result Value Ref Range    Color (UA POC) Yellow     Clarity (UA POC) Clear     Glucose (UA POC) Negative Negative    Bilirubin (UA POC) Negative Negative    Ketones (UA POC) Negative Negative    Specific gravity (UA POC) 1.020 1.001 - 1.035    Blood (UA POC) 2+ Negative    pH (UA POC) 5.5 4.6 - 8.0    Protein (UA POC) 2+ Negative mg/dL    Urobilinogen (UA POC) 0.2 mg/dL 0.2 - 1    Nitrites (UA POC) Negative Negative    Leukocyte esterase (UA POC) Negative Negative       Physical Exam   Constitutional: She appears well-developed and well-nourished. HENT:   Head: Atraumatic. Cardiovascular: Normal rate. Pulmonary/Chest: Effort normal.   Neurological: She is alert. Skin: Skin is warm. Psychiatric: She has a normal mood and affect. Her behavior is normal.   Nursing note and vitals reviewed. ASSESSMENT and PLAN    ICD-10-CM ICD-9-CM    1. Urinary frequency R35.0 788.41 AMB POC URINALYSIS DIP STICK AUTO W/O MICRO   2.  Hematuria R31.9 599.70 REFERRAL TO FEMALE PELVIC MEDICINE AND RECONSTRUCTIVE SURGERY   3. Proteinuria, unspecified type R80.9 791.0      Follow-up Disposition:  Return if symptoms worsen or fail to improve.  lab results and schedule of future lab studies reviewed with patient  reviewed medications and side effects in detail    - Alarm signals discussed. ER precautions  - Plan of care reviewed with patient. Understanding verbalized and they are in agreement with plan of care.

## 2017-07-14 NOTE — PATIENT INSTRUCTIONS

## 2017-07-14 NOTE — MR AVS SNAPSHOT
Visit Information Date & Time Provider Department Dept. Phone Encounter #  
 7/14/2017  8:45 AM Ana Patel NP [facility] [employer] [phone] [id number] Follow-up Instructions Return if symptoms worsen or fail to improve. Upcoming Health Maintenance Date Due  
 PAP AKA CERVICAL CYTOLOGY 2/14/2015 INFLUENZA AGE 9 TO ADULT 8/1/2017 DTaP/Tdap/Td series (2 - Td) 8/21/2022 Allergies as of 7/14/2017  Review Complete On: 7/14/2017 By: Josefina Ansari Severity Noted Reaction Type Reactions Tylox [Oxycodone-acetaminophen] Medium 08/11/2012   Systemic Itching Current Immunizations  Reviewed on 1/11/2016 Name Date Influenza Vaccine 9/26/2013 Tdap 8/21/2012 Not reviewed this visit You Were Diagnosed With   
  
 Codes Comments Urinary frequency    -  Primary ICD-10-CM: R35.0 ICD-9-CM: 788.41 Hematuria     ICD-10-CM: R31.9 ICD-9-CM: 599.70 Vitals BP Pulse Temp Resp Height(growth percentile) Weight(growth percentile) 110/82 72 97.2 °F (36.2 °C) 16 5' 5\" (1.651 m) 141 lb (64 kg) SpO2 BMI OB Status Smoking Status 99% 23.46 kg/m2 IUD Never Smoker Vitals History BMI and BSA Data Body Mass Index Body Surface Area  
 23.46 kg/m 2 1.71 m 2 Preferred Pharmacy Pharmacy Name Phone [address] [phone] [facility] [phone] Your Updated Medication List  
  
   
This list is accurate as of: 7/14/17  9:28 AM.  Always use your most recent med list.  
  
  
  
  
 fluticasone 50 mcg/actuation nasal spray Commonly known as:  Brain Public 2 Sprays by Both Nostrils route daily. Indications: ALLERGIC RHINITIS  
  
 hydrocortisone 1 % topical cream  
Commonly known as:  CORTAID Apply  to affected area two (2) times daily as needed for Skin Irritation. MIRENA 20 mcg/24 hr (5 years) IUD Generic drug:  levonorgestrel 1 Each by IntraUTERine route once. ZyrTEC 10 mg Cap Generic drug:  Cetirizine Take  by mouth. We Performed the Following AMB POC URINALYSIS DIP STICK AUTO W/O MICRO [21516 CPT(R)] REFERRAL TO FEMALE PELVIC MEDICINE AND RECONSTRUCTIVE SURGERY [[id number] Custom] Follow-up Instructions Return if symptoms worsen or fail to improve. Referral Information Referral ID Referred By Referred To  
  
 [id number] Eveline DONNELLY Not Available Visits Status Start Date End Date 1 New Request 7/14/17 7/14/18 If your referral has a status of pending review or denied, additional information will be sent to support the outcome of this decision. Patient Instructions Blood in the Urine: Care Instructions Your Care Instructions Blood in the urine, or hematuria, may make the urine look red, brown, or pink. There may be blood every time you urinate or just from time to time. You cannot always see blood in the urine, but it will show up in a urine test. 
Blood in the urine may be serious. It should always be checked by a doctor. Your doctor may recommend more tests, including an X-ray, a CT scan, or a cystoscopy (which lets a doctor look inside the urethra and bladder). Blood in the urine can be a sign of another problem. Common causes are bladder infections and kidney stones. An injury to your groin or your genital area can also cause bleeding in the urinary tract. Very hard exercisesuch as running a marathoncan cause blood in the urine. Blood in the urine can also be a sign of kidney disease or cancer in the bladder or kidney. Many cases of blood in the urine are caused by a harmless condition that runs in families. This is called benign familial hematuria. It does not need any treatment. Sometimes your urine may look red or brown even though it does not contain blood.  For example, not getting enough fluids (dehydration), taking certain medicines, or having a liver problem can change the color of your urine. Eating foods such as beets, rhubarb, or blackberries or foods with red food coloring can make your urine look red or pink. Follow-up care is a key part of your treatment and safety. Be sure to make and go to all appointments, and call your doctor if you are having problems. It's also a good idea to know your test results and keep a list of the medicines you take. When should you call for help? Call your doctor now or seek immediate medical care if: 
· You have symptoms of a urinary infection. For example: ¨ You have pus in your urine. ¨ You have pain in your back just below your rib cage. This is called flank pain. ¨ You have a fever, chills, or body aches. ¨ It hurts to urinate. ¨ You have groin or belly pain. · You have more blood in your urine. Watch closely for changes in your health, and be sure to contact your doctor if: 
· You have new urination problems. · You do not get better as expected. Where can you learn more? Go to http://yue-oswaldo.info/. Enter F504 in the search box to learn more about \"Blood in the Urine: Care Instructions. \" Current as of: March 20, 2017 Content Version: 11.3 © 7940-2349 Telepath. Care instructions adapted under license by Animal Cell [employer] (which disclaims liability or warranty for this information). If you have questions about a medical condition or this instruction, always ask your healthcare professional. [place] [address] any warranty or liability for your use of this information. Introducing [address]! Annette Jackson introduces Marakana patient portal. Now you can access parts of your medical record, email your doctor's office, and request medication refills online. 1. In your internet browser, go to [email or web address] 2. Click on the First Time User? Click Here link in the Sign In box. You will see the New Member Sign Up page. 3. Enter your Ferment[employer] International Access Code exactly as it appears below. You will not need to use this code after youve completed the sign-up process. If you do not sign up before the expiration date, you must request a new code. · [employer] Access Code: [email or web address] Expires: 9/28/2017 11:17 AM 
 
4. Enter the last four digits of your Social Security Number (xxxx) and Date of Birth (mm/dd/yyyy) as indicated and click Submit. You will be taken to the next sign-up page. 5. Create a Ferment[employer] International ID. This will be your Fer[employer] login ID and cannot be changed, so think of one that is secure and easy to remember. 6. Create a Fer[employer] password. You can change your password at any time. 7. Enter your Password Reset Question and Answer. This can be used at a later time if you forget your password. 8. Enter your e-mail address. You will receive e-mail notification when new information is available in [address] 9. Click Sign Up. You can now view and download portions of your medical record. 10. Click the Download Summary menu link to download a portable copy of your medical information. If you have questions, please visit the Frequently Asked Questions section of the [employer] website. Remember, [employer] is NOT to be used for urgent needs. For medical emergencies, dial 911. Now available from your iPhone and Android! Please provide this summary of care documentation to your next provider. Your primary care clinician is listed as Luciano Kwong. If you have any questions after today's visit, please call [phone].

## 2017-09-14 ENCOUNTER — OFFICE VISIT (OUTPATIENT)
Dept: FAMILY MEDICINE CLINIC | Facility: CLINIC | Age: 32
End: 2017-09-14

## 2017-09-14 VITALS
DIASTOLIC BLOOD PRESSURE: 81 MMHG | TEMPERATURE: 98.3 F | WEIGHT: 141 LBS | OXYGEN SATURATION: 100 % | HEART RATE: 88 BPM | HEIGHT: 65 IN | BODY MASS INDEX: 23.49 KG/M2 | SYSTOLIC BLOOD PRESSURE: 121 MMHG | RESPIRATION RATE: 16 BRPM

## 2017-09-14 DIAGNOSIS — J02.9 SORE THROAT: Primary | ICD-10-CM

## 2017-09-14 DIAGNOSIS — L30.9 ECZEMA, UNSPECIFIED TYPE: ICD-10-CM

## 2017-09-14 DIAGNOSIS — J32.9 SINUSITIS, UNSPECIFIED CHRONICITY, UNSPECIFIED LOCATION: ICD-10-CM

## 2017-09-14 RX ORDER — HYDROCORTISONE 1 %
CREAM (GRAM) TOPICAL
Qty: 85.2 G | Refills: 3 | Status: SHIPPED | OUTPATIENT
Start: 2017-09-14

## 2017-09-14 RX ORDER — PSEUDOEPHEDRINE HCL 120 MG/1
120 TABLET, FILM COATED, EXTENDED RELEASE ORAL
Qty: 14 TAB | Refills: 0 | Status: CANCELLED | OUTPATIENT
Start: 2017-09-14 | End: 2017-09-21

## 2017-09-14 RX ORDER — IBUPROFEN 600 MG/1
600 TABLET ORAL
Qty: 50 TAB | Refills: 0 | Status: CANCELLED | OUTPATIENT
Start: 2017-09-14

## 2017-09-14 RX ORDER — AZITHROMYCIN 250 MG/1
TABLET, FILM COATED ORAL
Qty: 6 TAB | Refills: 0 | Status: SHIPPED | OUTPATIENT
Start: 2017-09-14 | End: 2017-09-19

## 2017-09-14 NOTE — PROGRESS NOTES
HISTORY OF PRESENT ILLNESS  Kel Bartholomew is a 33 y.o. female. HPI Comments: Acute care visit with c/o sore throat and nasal congestion x 2 days. Reports her son tested positive for strep. Denies any fever or chills. She notes headaches. She has tried OTC angel seltzer and goody powder. H/o allergic rhinitis, she continues to take her zyrtec daily but has been unable to use her flonase d/t nasal congestion. Sore Throat    The history is provided by the patient. This is a new problem. The current episode started 2 days ago. The problem has not changed since onset. There has been no fever. Associated symptoms include congestion (nasal) and headaches. Pertinent negatives include no ear pain, no stridor, no swollen glands, no trouble swallowing, no stiff neck and no cough. She has had exposure to strep. She has had no exposure to mono. She has tried aspirin for the symptoms. The treatment provided no relief. Cold Symptoms   The history is provided by the patient. This is a new problem. The current episode started 2 days ago. The problem occurs constantly. The problem has not changed since onset. There has been no fever. Associated symptoms include headaches and sore throat. Pertinent negatives include no chills, no ear pain and no rhinorrhea. She is not a smoker. Her past medical history does not include pneumonia or heart failure. Review of Systems   Constitutional: Negative for chills. HENT: Positive for congestion (nasal) and sore throat. Negative for ear pain, rhinorrhea and trouble swallowing. Respiratory: Negative for cough and stridor. Genitourinary: Negative. Musculoskeletal: Negative. Neurological: Positive for headaches. Endo/Heme/Allergies: Positive for environmental allergies.      Past Medical History:   Diagnosis Date    Asthma     Gallbladder polyp 4/12/2011    Migraine      Past Surgical History:   Procedure Laterality Date    HX ACL RECONSTRUCTION  7-2010    Dr Addison Good  HX  SECTION       Current Outpatient Prescriptions on File Prior to Visit   Medication Sig Dispense Refill    fluticasone (FLONASE) 50 mcg/actuation nasal spray 2 Sprays by Both Nostrils route daily. Indications: ALLERGIC RHINITIS (Patient taking differently: 2 Sprays by Both Nostrils route as needed. Indications: ALLERGIC RHINITIS) 3 Bottle 3    Cetirizine (ZYRTEC) 10 mg cap Take  by mouth.  levonorgestrel (MIRENA) 20 mcg/24 hour (5 years) IUD 1 Each by IntraUTERine route once. No current facility-administered medications on file prior to visit. Allergies and Intolerances: Allergies   Allergen Reactions    Tylox [Oxycodone-Acetaminophen] Itching       Family History:   Family History   Problem Relation Age of Onset    Asthma Mother     Thyroid Disease Mother     Diabetes Father     Hypertension Father     Headache Father     Migraines Father     Bipolar Disorder Father     Psychiatric Disorder Father     Diabetes Paternal Grandmother     Alcohol abuse Paternal Grandfather        Social History:   She  reports that she has never smoked. She has never used smokeless tobacco. She  reports that she drinks alcohol. Vitals:   Visit Vitals    /81 (BP 1 Location: Right arm, BP Patient Position: Sitting)  Comment: lg cuff automated    Pulse 88    Temp 98.3 °F (36.8 °C) (Oral)    Resp 16    Ht 5' 5\" (1.651 m)    Wt 141 lb (64 kg)    SpO2 100%    BMI 23.46 kg/m2     Body surface area is 1.71 meters squared. .No results found for this or any previous visit (from the past 12 hour(s)). Physical Exam   Constitutional: She is oriented to person, place, and time. She appears well-developed and well-nourished. HENT:   Head: Atraumatic. Right Ear: External ear normal.   Left Ear: External ear normal.   Nose: Nose normal.   Mouth/Throat: Uvula swelling present. Cardiovascular: Normal rate.     Pulmonary/Chest: Effort normal and breath sounds normal. She has no wheezes. Neurological: She is alert and oriented to person, place, and time. Skin: Skin is warm. Psychiatric: She has a normal mood and affect. Nursing note and vitals reviewed. ASSESSMENT and PLAN    ICD-10-CM ICD-9-CM    1. Sore throat J02.9 462 AMB POC RAPID STREP A   2. Eczema, unspecified type L30.9 692.9 hydrocortisone (CORTAID) 1 % topical cream   3. Sinusitis, unspecified chronicity, unspecified location J32.9 473.9 azithromycin (ZITHROMAX) 250 mg tablet     Follow-up Disposition:  Return if symptoms worsen or fail to improve.  lab results and schedule of future lab studies reviewed with patient  reviewed medications and side effects in detail  Saline rinses as needed. - Alarm signals discussed. ER precautions  - Plan of care reviewed with patient. Understanding verbalized and they are in agreement with plan of care. I have reviewed this encounter and agree with Ms. Mary's plan.   Myra Muro MD

## 2017-09-14 NOTE — PROGRESS NOTES
HISTORY OF PRESENT ILLNESS  Rosaland Burkitt is a 33 y.o. female. HPI  Presents with 2-3 day onset of worsening sore throat, nasal congestion, fatigue, and headaches. Emy Blanchard plus cold was taken last night and this morning with no relief. Gregory Genta was taken this morning with headache relief. Son was diagnosed with strep this morning, and her twin children also has similar sx and will be seen by physician tomorrow. Also c/o of eczema worsening the past few days, with no relief from Vasoline, and requests hydrocortisone cream.     Review of Systems   Constitutional: Negative for chills and fever. HENT: Negative for ear pain. Eyes: Negative for pain. Respiratory: Negative for cough, shortness of breath and wheezing. Gastrointestinal: Positive for nausea. Negative for vomiting. Physical Exam   Constitutional: She appears well-developed and well-nourished. No distress. HENT:   Right Ear: External ear normal.   Left Ear: External ear normal.   Nose: Nose normal.   Mouth/Throat: Mucous membranes are normal. Posterior oropharyngeal erythema present. Cardiovascular: Normal rate, regular rhythm and intact distal pulses. Exam reveals no friction rub. No murmur heard. Pulmonary/Chest: Breath sounds normal. She has no wheezes. She has no rales. Skin: Skin is dry. No erythema.             ASSESSMENT and PLAN  N/A

## 2017-09-14 NOTE — PATIENT INSTRUCTIONS
Saline Nasal Washes: Care Instructions  Your Care Instructions  Saline nasal washes help keep the nasal passages open by washing out thick or dried mucus. This simple remedy can help relieve symptoms of allergies, sinusitis, and colds. It also can make the nose feel more comfortable by keeping the mucous membranes moist. You may notice a little burning sensation in your nose the first few times you use the solution, but this usually gets better in a few days. Follow-up care is a key part of your treatment and safety. Be sure to make and go to all appointments, and call your doctor if you are having problems. It's also a good idea to know your test results and keep a list of the medicines you take. How can you care for yourself at home? · You can buy premixed saline solution in a squeeze bottle or other sinus rinse products at a drugstore. Read and follow the instructions on the label. · You also can make your own saline solution by adding 1 teaspoon of salt and 1 teaspoon of baking soda to 2 cups of distilled water. · If you use a homemade solution, pour a small amount into a clean bowl. Using a rubber bulb syringe, squeeze the syringe and place the tip in the salt water. Pull a small amount of the salt water into the syringe by relaxing your hand. · Sit down with your head tilted slightly back. Do not lie down. Put the tip of the bulb syringe or the squeeze bottle a little way into one of your nostrils. Gently drip or squirt a few drops into the nostril. Repeat with the other nostril. Some sneezing and gagging are normal at first.  · Gently blow your nose. · Wipe the syringe or bottle tip clean after each use. · Repeat this 2 or 3 times a day. · Use nasal washes gently if you have nosebleeds often. When should you call for help? Watch closely for changes in your health, and be sure to contact your doctor if:  · You often get nosebleeds. · You have problems doing the nasal washes.   Where can you learn more? Go to http://yue-oswaldo.info/. Enter [id number] [id number] in the search box to learn more about \"Saline Nasal Washes: Care Instructions. \"  Current as of: May 4, 2017  Content Version: 11.3  © 3311-9405 [employer]. Care instructions adapted under license by [employer] (which disclaims liability or warranty for this information). If you have questions about a medical condition or this instruction, always ask your healthcare professional. [address] any warranty or liability for your use of this information. Atopic Dermatitis: Care Instructions  Your Care Instructions  Atopic dermatitis (also called eczema) is a skin problem that causes intense itching and a red, raised rash. In severe cases, the rash develops clear fluid-filled blisters. The rash is not contagious. People with this condition seem to have very sensitive immune systems that are likely to react to things that cause allergies. The immune system is the body's way of fighting infection. There is no cure for atopic dermatitis, but you may be able to control it with care at home. Follow-up care is a key part of your treatment and safety. Be sure to make and go to all appointments, and call your doctor if you are having problems. It's also a good idea to know your test results and keep a list of the medicines you take. How can you care for yourself at home? · Use moisturizer at least twice a day. · If your doctor prescribes a cream, use it as directed. If your doctor prescribes other medicine, take it exactly as directed. · Wash the affected area with water only. Soap can make dryness and itching worse. Pat dry. · Apply a moisturizer after bathing. Use a cream such as Lubriderm, Moisturel, or Cetaphil that does not irritate the skin or cause a rash. Apply the cream while your skin is still damp after lightly drying with a towel. · Use cold, wet cloths to reduce itching.   · Keep cool, and stay out of the sun. · If itching affects your normal activities, an over-the-counter antihistamine, such as diphenhydramine (Benadryl) or loratadine (Claritin) may help. Read and follow all instructions on the label. When should you call for help? Call your doctor now or seek immediate medical care if:  · Your rash gets worse and you have a fever. · You have new blisters or bruises, or the rash spreads and looks like a sunburn. · You have signs of infection, such as:  ¨ Increased pain, swelling, warmth, or redness. ¨ Red streaks leading from the rash. ¨ Pus draining from the rash. ¨ A fever. · You have crusting or oozing sores. · You have joint aches or body aches along with your rash. Watch closely for changes in your health, and be sure to contact your doctor if:  · Your rash does not clear up after 2 to 3 weeks of home treatment. · Itching interferes with your sleep or daily activities. Where can you learn more? Go to http://yue-oswaldo.info/. Enter A902 in the search box to learn more about \"Atopic Dermatitis: Care Instructions. \"  Current as of: October 13, 2016  Content Version: 11.3  © 0077-7679 [employer]. Care instructions adapted under license by [employer] (which disclaims liability or warranty for this information). If you have questions about a medical condition or this instruction, always ask your healthcare professional. [place] [address] any warranty or liability for your use of this information.

## 2017-09-14 NOTE — MR AVS SNAPSHOT
Visit Information Date & Time Provider Department Dept. Phone Encounter #  
 9/14/2017  4:00 PM Mohinder Steven NP [facility] [employer] [phone] [id number] Follow-up Instructions Return if symptoms worsen or fail to improve. Upcoming Health Maintenance Date Due  
 PAP AKA CERVICAL CYTOLOGY 2/14/2015 INFLUENZA AGE 9 TO ADULT 8/1/2017 DTaP/Tdap/Td series (2 - Td) 8/21/2022 Allergies as of 9/14/2017  Review Complete On: 9/14/2017 By: Giselle Alexander LPN Severity Noted Reaction Type Reactions Tylox [Oxycodone-acetaminophen] Medium 08/11/2012   Systemic Itching Current Immunizations  Reviewed on 1/11/2016 Name Date Influenza Vaccine 9/26/2013 Tdap 8/21/2012 Not reviewed this visit You Were Diagnosed With   
  
 Codes Comments Sore throat    -  Primary ICD-10-CM: J02.9 ICD-9-CM: 890 Eczema, unspecified type     ICD-10-CM: L30.9 ICD-9-CM: 692.9 Sinusitis, unspecified chronicity, unspecified location     ICD-10-CM: J32.9 ICD-9-CM: 473.9 Vitals BP Pulse Temp Resp Height(growth percentile) Weight(growth percentile) 121/81 (BP 1 Location: Right arm, BP Patient Position: Sitting) 88 98.3 °F (36.8 °C) (Oral) 16 5' 5\" (1.651 m) 141 lb (64 kg) SpO2 BMI OB Status Smoking Status 100% 23.46 kg/m2 IUD Never Smoker BMI and BSA Data Body Mass Index Body Surface Area  
 23.46 kg/m 2 1.71 m 2 Preferred Pharmacy Pharmacy Name Phone [address], [address] [place] [address] [phone] [facility] [phone] Your Updated Medication List  
  
   
This list is accurate as of: 9/14/17  4:42 PM.  Always use your most recent med list.  
  
  
  
  
 azithromycin 250 mg tablet Commonly known as:  Vivek Granger Take 2 tablets today, then take 1 tablet daily  
  
 fluticasone 50 mcg/actuation nasal spray Commonly known as:  María Elena Muñoz 2 Sprays by Both Nostrils route daily. Indications: ALLERGIC RHINITIS  
  
 hydrocortisone 1 % topical cream  
Commonly known as:  CORTAID Apply  to affected area two (2) times daily as needed for Skin Irritation. MIRENA 20 mcg/24 hr (5 years) IUD Generic drug:  levonorgestrel 1 Each by IntraUTERine route once. ZyrTEC 10 mg Cap Generic drug:  Cetirizine Take  by mouth. Prescriptions Sent to Pharmacy Refills  
 hydrocortisone (CORTAID) 1 % topical cream 3 Sig: Apply  to affected area two (2) times daily as needed for Skin Irritation. Class: Normal  
 Pharmacy: [address] [place] [address] Ph #: [phone] Route: Topical  
 azithromycin (ZITHROMAX) 250 mg tablet 0 Sig: Take 2 tablets today, then take 1 tablet daily Class: Normal  
 Pharmacy: [address] [place] [address] Ph #: [phone] We Performed the Following AMB POC RAPID STREP A [94299 CPT(R)] Follow-up Instructions Return if symptoms worsen or fail to improve. Patient Instructions Saline Nasal Washes: Care Instructions Your Care Instructions Saline nasal washes help keep the nasal passages open by washing out thick or dried mucus. This simple remedy can help relieve symptoms of allergies, sinusitis, and colds. It also can make the nose feel more comfortable by keeping the mucous membranes moist. You may notice a little burning sensation in your nose the first few times you use the solution, but this usually gets better in a few days. Follow-up care is a key part of your treatment and safety. Be sure to make and go to all appointments, and call your doctor if you are having problems. It's also a good idea to know your test results and keep a list of the medicines you take. How can you care for yourself at home? · You can buy premixed saline solution in a squeeze bottle or other sinus rinse products at a drugstore. Read and follow the instructions on the label. · You also can make your own saline solution by adding 1 teaspoon of salt and 1 teaspoon of baking soda to 2 cups of distilled water. · If you use a homemade solution, pour a small amount into a clean bowl. Using a rubber bulb syringe, squeeze the syringe and place the tip in the salt water. Pull a small amount of the salt water into the syringe by relaxing your hand. · Sit down with your head tilted slightly back. Do not lie down. Put the tip of the bulb syringe or the squeeze bottle a little way into one of your nostrils. Gently drip or squirt a few drops into the nostril. Repeat with the other nostril. Some sneezing and gagging are normal at first. 
· Gently blow your nose. · Wipe the syringe or bottle tip clean after each use. · Repeat this 2 or 3 times a day. · Use nasal washes gently if you have nosebleeds often. When should you call for help? Watch closely for changes in your health, and be sure to contact your doctor if: 
· You often get nosebleeds. · You have problems doing the nasal washes. Where can you learn more? Go to http://yue-oswaldo.info/. Enter [id number] [id number] [phone] in the search box to learn more about \"Saline Nasal Washes: Care Instructions. \" Current as of: May 4, 2017 Content Version: 11.3 © 2057-7943 [employer] Magnetics. Care instructions adapted under license by Flinja (which disclaims liability or warranty for this information). If you have questions about a medical condition or this instruction, always ask your healthcare professional. [place] [address] any warranty or liability for your use of this information. Atopic Dermatitis: Care Instructions Your Care Instructions Atopic dermatitis (also called eczema) is a skin problem that causes intense itching and a red, raised rash. In severe cases, the rash develops clear fluidfilled blisters. The rash is not contagious. People with this condition seem to have very sensitive immune systems that are likely to react to things that cause allergies. The immune system is the body's way of fighting infection. There is no cure for atopic dermatitis, but you may be able to control it with care at home. Follow-up care is a key part of your treatment and safety. Be sure to make and go to all appointments, and call your doctor if you are having problems. It's also a good idea to know your test results and keep a list of the medicines you take. How can you care for yourself at home? · Use moisturizer at least twice a day. · If your doctor prescribes a cream, use it as directed. If your doctor prescribes other medicine, take it exactly as directed. · Wash the affected area with water only. Soap can make dryness and itching worse. Pat dry. · Apply a moisturizer after bathing. Use a cream such as Lubriderm, Moisturel, or Cetaphil that does not irritate the skin or cause a rash. Apply the cream while your skin is still damp after lightly drying with a towel. · Use cold, wet cloths to reduce itching. · Keep cool, and stay out of the sun. · If itching affects your normal activities, an over-the-counter antihistamine, such as diphenhydramine (Benadryl) or loratadine (Claritin) may help. Read and follow all instructions on the label. When should you call for help? Call your doctor now or seek immediate medical care if: 
· Your rash gets worse and you have a fever. · You have new blisters or bruises, or the rash spreads and looks like a sunburn. · You have signs of infection, such as: 
¨ Increased pain, swelling, warmth, or redness. ¨ Red streaks leading from the rash. ¨ Pus draining from the rash. ¨ A fever. · You have crusting or oozing sores. · You have joint aches or body aches along with your rash. Watch closely for changes in your health, and be sure to contact your doctor if: 
· Your rash does not clear up after 2 to 3 weeks of home treatment. · Itching interferes with your sleep or daily activities. Where can you learn more? Go to http://yue-oswaldo.info/. Enter U677 in the search box to learn more about \"Atopic Dermatitis: Care Instructions. \" Current as of: October 13, 2016 Content Version: 11.3 © 5774-7466 Wello. Care instructions adapted under license by Sobrr (which disclaims liability or warranty for this information). If you have questions about a medical condition or this instruction, always ask your healthcare professional. [address] any warranty or liability for your use of this information. Introducing \[facility]\"" & HEALTH SERVICES! [facility] introduces Airec patient portal. Now you can access parts of your medical record, email your doctor's office, and request medication refills online. 1. In your internet browser, go to [email or web address] 2. Click on the First Time User? Click Here link in the Sign In box. You will see the New Member Sign Up page. 3. Enter your Airec Access Code exactly as it appears below. You will not need to use this code after youve completed the sign-up process. If you do not sign up before the expiration date, you must request a new code. · Airec Access Code: [email or web address] Expires: 9/28/2017 11:17 AM 
 
4. Enter the last four digits of your Social Security Number (xxxx) and Date of Birth (mm/dd/yyyy) as indicated and click Submit. You will be taken to the next sign-up page. 5. Create a Airec ID. This will be your Airec login ID and cannot be changed, so think of one that is secure and easy to remember. 6. Create a Takepint password. You can change your password at any time. 7. Enter your Password Reset Question and Answer. This can be used at a later time if you forget your password. 8. Enter your e-mail address. You will receive e-mail notification when new information is available in [address] 9. Click Sign Up. You can now view and download portions of your medical record. 10. Click the Download Summary menu link to download a portable copy of your medical information. If you have questions, please visit the Frequently Asked Questions section of the BeThere[employer]ards website. Remember, BeThere[employer]ards is NOT to be used for urgent needs. For medical emergencies, dial 911. Now available from your iPhone and Android! Please provide this summary of care documentation to your next provider. Your primary care clinician is listed as Ras Garcia. If you have any questions after today's visit, please call [phone].

## 2017-09-14 NOTE — PROGRESS NOTES
Chief Complaint   Patient presents with    Sore Throat     x 2 days . Son was dx with strep throat    Cold Symptoms     1. Have you been to the ER, urgent care clinic since your last visit? Hospitalized since your last visit? No    2. Have you seen or consulted any other health care providers outside of the [address] [place] [address] since your last visit? Include any pap smears or colon screening.  No

## 2017-09-15 LAB
S PYO AG THROAT QL: NEGATIVE
VALID INTERNAL CONTROL?: YES

## 2018-02-13 ENCOUNTER — OFFICE VISIT (OUTPATIENT)
Dept: FAMILY MEDICINE CLINIC | Facility: CLINIC | Age: 33
End: 2018-02-13

## 2018-02-13 VITALS
TEMPERATURE: 98 F | OXYGEN SATURATION: 100 % | SYSTOLIC BLOOD PRESSURE: 112 MMHG | BODY MASS INDEX: 26.89 KG/M2 | DIASTOLIC BLOOD PRESSURE: 73 MMHG | WEIGHT: 161.4 LBS | HEIGHT: 65 IN | RESPIRATION RATE: 16 BRPM | HEART RATE: 82 BPM

## 2018-02-13 DIAGNOSIS — J10.1 INFLUENZA A: ICD-10-CM

## 2018-02-13 DIAGNOSIS — R68.89 FLU-LIKE SYMPTOMS: Primary | ICD-10-CM

## 2018-02-13 RX ORDER — OSELTAMIVIR PHOSPHATE 75 MG/1
75 CAPSULE ORAL 2 TIMES DAILY
Qty: 10 CAP | Refills: 0 | Status: SHIPPED | OUTPATIENT
Start: 2018-02-13 | End: 2018-02-18

## 2018-02-13 NOTE — MR AVS SNAPSHOT
Juliet Asencio 
 
 
 14 [facility] 1 [facility] [address] 
[phone] Patient: Shashi Bedolla MRN: [id number] MICHELLE:[id number] Visit Information Date & Time Provider Department Dept. Phone Encounter #  
 2/13/2018  8:15 AM Tiffanie Zhang NP [employer] [phone] [id number] Follow-up Instructions Return if symptoms worsen or fail to improve. Upcoming Health Maintenance Date Due  
 PAP AKA CERVICAL CYTOLOGY 2/14/2015 Influenza Age 5 to Adult 8/1/2017 DTaP/Tdap/Td series (2 - Td) 8/21/2022 Allergies as of 2/13/2018  Review Complete On: 2/13/2018 By: Tiffanie Zhang NP Severity Noted Reaction Type Reactions Tylox [Oxycodone-acetaminophen] Medium 08/11/2012   Systemic Itching Current Immunizations  Reviewed on 1/11/2016 Name Date Influenza Vaccine 9/26/2013 Tdap 8/21/2012 Not reviewed this visit You Were Diagnosed With   
  
 Codes Comments Flu-like symptoms    -  Primary ICD-10-CM: R68.89 ICD-9-CM: 780.99 Influenza A     ICD-10-CM: J10.1 ICD-9-CM: 487. 1 Vitals BP Pulse Temp Resp Height(growth percentile) Weight(growth percentile) 112/73 (BP 1 Location: Left arm, BP Patient Position: Sitting) 82 98 °F (36.7 °C) (Oral) 16 5' 5\" (1.651 m) 161 lb 6.4 oz (73.2 kg) SpO2 BMI OB Status Smoking Status 100% 26.86 kg/m2 IUD Never Smoker BMI and BSA Data Body Mass Index Body Surface Area  
 26.86 kg/m 2 1.83 m 2 Preferred Pharmacy Pharmacy Name Phone [address], [address] [place] [address] [phone] [facility] [phone] Your Updated Medication List  
  
   
This list is accurate as of: 2/13/18  9:44 AM.  Always use your most recent med list.  
  
  
  
  
 fluticasone 50 mcg/actuation nasal spray Commonly known as:  [employer] Vital [employer] 2 Sprays by Both Nostrils route daily. Indications: ALLERGIC RHINITIS  
  
 hydrocortisone 1 % topical cream  
Commonly known as:  CORTAID Apply  to affected area two (2) times daily as needed for Skin Irritation. MIRENA 20 mcg/24 hr (5 years) Iud  
Generic drug:  levonorgestrel 1 Each by IntraUTERine route once. oseltamivir 75 mg capsule Commonly known as:  TAMIFLU Take 1 Cap by mouth two (2) times a day for 5 days. ZyrTEC 10 mg Cap Generic drug:  Cetirizine Take  by mouth. Prescriptions Sent to Pharmacy Refills  
 oseltamivir (TAMIFLU) 75 mg capsule 0 Sig: Take 1 Cap by mouth two (2) times a day for 5 days. Class: Normal  
 Pharmacy: [address] [place] [address] #: [phone] Route: Oral  
  
We Performed the Following AMB POC JESÚS INFLUENZA A/B TEST [67392 CPT(R)] Follow-up Instructions Return if symptoms worsen or fail to improve. Patient Instructions Influenza (Flu): Care Instructions Your Care Instructions Influenza (flu) is an infection in the lungs and breathing passages. It is caused by the influenza virus. There are different strains, or types, of the flu virus from year to year. Unlike the common cold, the flu comes on suddenly and the symptoms, such as a cough, congestion, fever, chills, fatigue, aches, and pains, are more severe. These symptoms may last up to 10 days. Although the flu can make you feel very sick, it usually doesn't cause serious health problems. Home treatment is usually all you need for flu symptoms. But your doctor may prescribe antiviral medicine to prevent other health problems, such as pneumonia, from developing. Older people and those who have a long-term health condition, such as lung disease, are most at risk for having pneumonia or other health problems. Follow-up care is a key part of your treatment and safety. Be sure to make and go to all appointments, and call your doctor if you are having problems. It's also a good idea to know your test results and keep a list of the medicines you take. How can you care for yourself at home? · Get plenty of rest. 
· Drink plenty of fluids, enough so that your urine is light yellow or clear like water. If you have kidney, heart, or liver disease and have to limit fluids, talk with your doctor before you increase the amount of fluids you drink. · Take an over-the-counter pain medicine if needed, such as acetaminophen (Tylenol), ibuprofen (Advil, Motrin), or naproxen (Aleve), to relieve fever, headache, and muscle aches. Read and follow all instructions on the label. No one younger than 20 should take aspirin. It has been linked to Reye syndrome, a serious illness. · Do not smoke. Smoking can make the flu worse. If you need help quitting, talk to your doctor about stop-smoking programs and medicines. These can increase your chances of quitting for good. · Breathe moist air from a hot shower or from a sink filled with hot water to help clear a stuffy nose. · Before you use cough and cold medicines, check the label. These medicines may not be safe for young children or for people with certain health problems. · If the skin around your nose and lips becomes sore, put some petroleum jelly on the area. · To ease coughing: ¨ Drink fluids to soothe a scratchy throat. ¨ Suck on cough drops or plain hard candy. ¨ Take an over-the-counter cough medicine that contains dextromethorphan to help you get some sleep. Read and follow all instructions on the label. ¨ Raise your head at night with an extra pillow. This may help you rest if coughing keeps you awake. · Take any prescribed medicine exactly as directed. Call your doctor if you think you are having a problem with your medicine.  
To avoid spreading the flu 
 · Wash your hands regularly, and keep your hands away from your face. · Stay home from school, work, and other public places until you are feeling better and your fever has been gone for at least 24 hours. The fever needs to have gone away on its own without the help of medicine. · Ask people living with you to talk to their doctors about preventing the flu. They may get antiviral medicine to keep from getting the flu from you. · To prevent the flu in the future, get a flu vaccine every fall. Encourage people living with you to get the vaccine. · Cover your mouth when you cough or sneeze. When should you call for help? Call 911 anytime you think you may need emergency care. For example, call if: 
? · You have severe trouble breathing. ?Call your doctor now or seek immediate medical care if: 
? · You have new or worse trouble breathing. ? · You seem to be getting much sicker. ? · You feel very sleepy or confused. ? · You have a new or higher fever. ? · You get a new rash. ? Watch closely for changes in your health, and be sure to contact your doctor if: 
? · You begin to get better and then get worse. ? · You are not getting better after 1 week. Where can you learn more? Go to http://yue-oswaldo.info/. Enter I150 in the search box to learn more about \"Influenza (Flu): Care Instructions. \" Current as of: May 12, 2017 Content Version: 11.4 © 3259-4904 [employer]. Care instructions adapted under license by [employer] (which disclaims liability or warranty for this information). If you have questions about a medical condition or this instruction, always ask your healthcare professional. [address] any warranty or liability for your use of this information. Introducing [facility] HE[facility]!    
 Rachael Garcia introduces [employer] patient portal. Now you can access parts of your medical record, email your doctor's office, and request medication refills online. 1. In your internet browser, go to [email or web address] Fourth Act 2. Click on the First Time User? Click Here link in the Sign In box. You will see the New Member Sign Up page. 3. Enter your Customer Alliance Access Code exactly as it appears below. You will not need to use this code after youve completed the sign-up process. If you do not sign up before the expiration date, you must request a new code. · Customer Alliance Access Code: [email or web address] Expires: 5/14/2018  8:20 AM 
 
4. Enter the last four digits of your Social Security Number (xxxx) and Date of Birth (mm/dd/yyyy) as indicated and click Submit. You will be taken to the next sign-up page. 5. Create a Customer Alliance ID. This will be your Customer Alliance login ID and cannot be changed, so think of one that is secure and easy to remember. 6. Create a Customer Alliance password. You can change your password at any time. 7. Enter your Password Reset Question and Answer. This can be used at a later time if you forget your password. 8. Enter your e-mail address. You will receive e-mail notification when new information is available in [address] 9. Click Sign Up. You can now view and download portions of your medical record. 10. Click the Download Summary menu link to download a portable copy of your medical information. If you have questions, please visit the Frequently Asked Questions section of the Customer Alliance website. Remember, Customer Alliance is NOT to be used for urgent needs. For medical emergencies, dial 911. Now available from your iPhone and Android! Please provide this summary of care documentation to your next provider. Your primary care clinician is listed as Korina Peoples. If you have any questions after today's visit, please call [phone].

## 2018-02-13 NOTE — PATIENT INSTRUCTIONS

## 2018-02-13 NOTE — PROGRESS NOTES
HISTORY OF PRESENT ILLNESS  Jumana Oswald is a 27 y.o. female. HPI Comments: Acute care visit with c/o fatigue, headache, abdominal pain and diarrhea x 1 day. Denies any fever, she has had some chills and abdominal pain. Reports her daughter tested positive for influenza yesterday. Reports a non productive cough. She did not receive her influenza vaccine this season. Lethargy   The history is provided by the patient. This is a new problem. The current episode started yesterday. The problem has not changed since onset. Associated symptoms include headaches. She has tried nothing for the symptoms. Headache   The history is provided by the patient. This is a new problem. The current episode started yesterday. The problem has not changed since onset. Associated symptoms include headaches. She has tried nothing for the symptoms. Review of Systems   Constitutional: Positive for malaise/fatigue. HENT: Negative. Respiratory: Positive for cough. Negative for sputum production. Cardiovascular: Negative. Gastrointestinal: Negative. Genitourinary: Negative. Musculoskeletal: Negative. Neurological: Positive for headaches. Past Medical History:   Diagnosis Date    Asthma     Gallbladder polyp 4/12/2011    Migraine      Past Surgical History:   Procedure Laterality Date    HX ACL RECONSTRUCTION  7-2010    Dr Kristin Perez Prescriptions on File Prior to Visit   Medication Sig Dispense Refill    hydrocortisone (CORTAID) 1 % topical cream Apply  to affected area two (2) times daily as needed for Skin Irritation. 85.2 g 3    fluticasone (FLONASE) 50 mcg/actuation nasal spray 2 Sprays by Both Nostrils route daily. Indications: ALLERGIC RHINITIS (Patient taking differently: 2 Sprays by Both Nostrils route as needed. Indications: ALLERGIC RHINITIS) 3 Bottle 3    Cetirizine (ZYRTEC) 10 mg cap Take  by mouth.       levonorgestrel (MIRENA) 20 mcg/24 hour (5 years) IUD 1 Each by IntraUTERine route once. No current facility-administered medications on file prior to visit. Allergies and Intolerances: Allergies   Allergen Reactions    Tylox [Oxycodone-Acetaminophen] Itching       Family History:   Family History   Problem Relation Age of Onset    Asthma Mother     Thyroid Disease Mother     Diabetes Father     Hypertension Father     Headache Father     Migraines Father     Bipolar Disorder Father     Psychiatric Disorder Father     Diabetes Paternal Grandmother     Alcohol abuse Paternal Grandfather        Social History:   She  reports that she has never smoked. She has never used smokeless tobacco. She  reports that she drinks alcohol. Vitals:   Visit Vitals    /73 (BP 1 Location: Left arm, BP Patient Position: Sitting)    Pulse 82    Temp 98 °F (36.7 °C) (Oral)    Resp 16    Ht 5' 5\" (1.651 m)    Wt 161 lb 6.4 oz (73.2 kg)    SpO2 100%    BMI 26.86 kg/m2     Body surface area is 1.83 meters squared. Recent Results (from the past 12 hour(s))   AMB POC JESÚS INFLUENZA A/B TEST    Collection Time: 02/13/18  9:15 AM   Result Value Ref Range    VALID INTERNAL CONTROL POC Yes     Influenza A Ag POC Positive Negative Pos/Neg    Influenza B Ag POC Negative Negative Pos/Neg       Physical Exam   Constitutional: She is oriented to person, place, and time. She appears well-developed and well-nourished. HENT:   Head: Atraumatic. Cardiovascular: Normal rate. Pulmonary/Chest: Effort normal and breath sounds normal.   Neurological: She is alert and oriented to person, place, and time. Skin: Skin is warm. Psychiatric: She has a normal mood and affect. Her behavior is normal.   Nursing note and vitals reviewed. ASSESSMENT and PLAN    ICD-10-CM ICD-9-CM    1. Flu-like symptoms R68.89 780.99 AMB POC JESÚS INFLUENZA A/B TEST   2.  Influenza A J10.1 487.1 oseltamivir (TAMIFLU) 75 mg capsule     Follow-up Disposition:  Return if symptoms worsen or fail to improve.  lab results and schedule of future lab studies reviewed with patient  reviewed medications and side effects in detail    - Alarm signals discussed. ER precautions  - Plan of care reviewed with patient. Understanding verbalized and they are in agreement with plan of care.

## 2018-04-04 ENCOUNTER — OFFICE VISIT (OUTPATIENT)
Dept: FAMILY MEDICINE CLINIC | Facility: CLINIC | Age: 33
End: 2018-04-04

## 2018-04-04 VITALS
WEIGHT: 166 LBS | HEART RATE: 90 BPM | BODY MASS INDEX: 27.66 KG/M2 | RESPIRATION RATE: 14 BRPM | SYSTOLIC BLOOD PRESSURE: 119 MMHG | HEIGHT: 65 IN | DIASTOLIC BLOOD PRESSURE: 78 MMHG | TEMPERATURE: 98.4 F | OXYGEN SATURATION: 98 %

## 2018-04-04 DIAGNOSIS — H92.03 OTALGIA, BILATERAL: ICD-10-CM

## 2018-04-04 DIAGNOSIS — J02.9 SORE THROAT: Primary | ICD-10-CM

## 2018-04-04 DIAGNOSIS — J02.0 STREP SORE THROAT: ICD-10-CM

## 2018-04-04 DIAGNOSIS — B37.31 CANDIDA VAGINITIS: ICD-10-CM

## 2018-04-04 LAB
S PYO AG THROAT QL: POSITIVE
VALID INTERNAL CONTROL?: YES

## 2018-04-04 RX ORDER — FLUCONAZOLE 150 MG/1
150 TABLET ORAL DAILY
Qty: 1 TAB | Refills: 0 | Status: SHIPPED | OUTPATIENT
Start: 2018-04-04 | End: 2018-04-05

## 2018-04-04 RX ORDER — AZITHROMYCIN 250 MG/1
TABLET, FILM COATED ORAL
Qty: 6 TAB | Refills: 0 | Status: SHIPPED | OUTPATIENT
Start: 2018-04-04 | End: 2018-04-09

## 2018-04-04 NOTE — MR AVS SNAPSHOT
[address] [address] 
[phone] Patient: Amalia Oleary MRN: [id number] ZHS:[id number]/43/[id number] Visit Information Date & Time Provider Department Dept. Phone Encounter #  
 4/4/2018  8:45 AM Sheri Noriega NP [facility] [phone] [id number] Follow-up Instructions Return if symptoms worsen or fail to improve. Upcoming Health Maintenance Date Due  
 PAP AKA CERVICAL CYTOLOGY 2/14/2015 Influenza Age 5 to Adult 8/1/2017 DTaP/Tdap/Td series (2 - Td) 8/21/2022 Allergies as of 4/4/2018  Review Complete On: 4/4/2018 By: Blanca Taylor Severity Noted Reaction Type Reactions Tylox [Oxycodone-acetaminophen] Medium 08/11/2012   Systemic Itching Current Immunizations  Reviewed on 1/11/2016 Name Date Influenza Vaccine 9/26/2013 Tdap 8/21/2012 Not reviewed this visit You Were Diagnosed With   
  
 Codes Comments Sore throat    -  Primary ICD-10-CM: J02.9 ICD-9-CM: 413 Strep sore throat     ICD-10-CM: J02.0 ICD-9-CM: 034.0 Otalgia, bilateral     ICD-10-CM: H92.03 
ICD-9-CM: 388.70 Vitals BP Pulse Temp Resp Height(growth percentile) Weight(growth percentile) 119/78 90 98.4 °F (36.9 °C) 14 5' 5\" (1.651 m) 166 lb (75.3 kg) SpO2 BMI OB Status Smoking Status 98% 27.62 kg/m2 IUD Never Smoker Vitals History BMI and BSA Data Body Mass Index Body Surface Area  
 27.62 kg/m 2 1.86 m 2 Preferred Pharmacy Pharmacy Name Phone [address], [address] [place] [address]30 [facility] [phone] Your Updated Medication List  
  
   
This list is accurate as of 4/4/18  9:18 AM.  Always use your most recent med list.  
  
  
  
  
 azithromycin 250 mg tablet Commonly known as:  Tawana Flatness Take 2 tablets today, then take 1 tablet daily fluticasone 50 mcg/actuation nasal spray Commonly known as:  Yvonne Courser 2 Sprays by Both Nostrils route daily. Indications: ALLERGIC RHINITIS  
  
 hydrocortisone 1 % topical cream  
Commonly known as:  CORTAID Apply  to affected area two (2) times daily as needed for Skin Irritation. MIRENA 20 mcg/24 hr (5 years) Iud  
Generic drug:  levonorgestrel 1 Each by IntraUTERine route once. ZyrTEC 10 mg Cap Generic drug:  Cetirizine Take  by mouth. Prescriptions Sent to Pharmacy Refills  
 azithromycin (ZITHROMAX) 250 mg tablet 0 Sig: Take 2 tablets today, then take 1 tablet daily Class: Normal  
 Pharmacy: [address] [place] [address] #: [phone] We Performed the Following AMB POC RAPID STREP A [83802 CPT(R)] Follow-up Instructions Return if symptoms worsen or fail to improve. Patient Instructions Strep Throat: Care Instructions Your Care Instructions Strep throat is a bacterial infection that causes sudden, severe sore throat and fever. Strep throat, which is caused by bacteria called streptococcus, is treated with antibiotics. Sometimes a strep test is necessary to tell if the sore throat is caused by strep bacteria. Treatment can help ease symptoms and may prevent future problems. Follow-up care is a key part of your treatment and safety. Be sure to make and go to all appointments, and call your doctor if you are having problems. It's also a good idea to know your test results and keep a list of the medicines you take. How can you care for yourself at home? · Take your antibiotics as directed. Do not stop taking them just because you feel better. You need to take the full course of antibiotics. · Strep throat can spread to others until 24 hours after you begin taking antibiotics.  During this time, you should avoid contact with other people at work or home, especially infants and children. Do not sneeze or cough on others, and wash your hands often. Keep your drinking glass and eating utensils separate from those of others, and wash these items well in hot, soapy water. · Gargle with warm salt water at least once each hour to help reduce swelling and make your throat feel better. Use 1 teaspoon of salt mixed in 8 fluid ounces of warm water. · Take an over-the-counter pain medication, such as acetaminophen (Tylenol), ibuprofen (Advil, Motrin), or naproxen (Aleve). Read and follow all instructions on the label. · Try an over-the-counter anesthetic throat spray or throat lozenges, which may help relieve throat pain. · Drink plenty of fluids. Fluids may help soothe an irritated throat. Hot fluids, such as tea or soup, may help your throat feel better. · Eat soft solids and drink plenty of clear liquids. Flavored ice pops, ice cream, scrambled eggs, sherbet, and gelatin dessert (such as Jell-O) may also soothe the throat. · Get lots of rest. 
· Do not smoke, and avoid secondhand smoke. If you need help quitting, talk to your doctor about stop-smoking programs and medicines. These can increase your chances of quitting for good. · Use a vaporizer or humidifier to add moisture to the air in your bedroom. Follow the directions for cleaning the machine. When should you call for help? Call your doctor now or seek immediate medical care if: 
? · You have a new or higher fever. ? · You have a fever with a stiff neck or severe headache. ? · You have new or worse trouble swallowing. ? · Your sore throat gets much worse on one side. ? · Your pain becomes much worse on one side of your throat. ? Watch closely for changes in your health, and be sure to contact your doctor if: 
? · You are not getting better after 2 days (48 hours). ? · You do not get better as expected. Where can you learn more? Go to http://yue-oswaldo.info/. Enter K625 in the search box to learn more about \"Strep Throat: Care Instructions. \" Current as of: May 12, 2017 Content Version: 11.4 © 6649-7212 [employer], [employer]. Care instructions adapted under license by [employer] (which disclaims liability or warranty for this information). If you have questions about a medical condition or this instruction, always ask your healthcare professional. [address] [address] any warranty or liability for your use of this information. Introducing [facility]! [facility] introduces [employer] patient portal. Now you can access parts of your medical record, email your doctor's office, and request medication refills online. 1. In your internet browser, go to [email or web address] ([email or web address] 2. Click on the First Time User? Click Here link in the Sign In box. You will see the New Member Sign Up page. 3. Enter your [employer] Access Code exactly as it appears below. You will not need to use this code after youve completed the sign-up process. If you do not sign up before the expiration date, you must request a new code. · [employer] Access Code: [email or web address] Expires: 5/14/2018  9:20 AM 
 
4. Enter the last four digits of your Social Security Number (xxxx) and Date of Birth (mm/dd/yyyy) as indicated and click Submit. You will be taken to the next sign-up page. 5. Create a Profitero ID. This will be your Prof[employer]o login ID and cannot be changed, so think of one that is secure and easy to remember. 6. Create a [employer]o password. You can change your password at any time. 7. Enter your Password Reset Question and Answer. This can be used at a later time if you forget your password. 8. Enter your e-mail address. You will receive e-mail notification when new information is available in [address] 9. Click Sign Up. You can now view and download portions of your medical record. 10. Click the Download Summary menu link to download a portable copy of your medical information. If you have questions, please visit the Frequently Asked Questions section of the [employer] website. Remember, [employer] is NOT to be used for urgent needs. For medical emergencies, dial 911. Now available from your iPhone and Android! Please provide this summary of care documentation to your next provider. Your primary care clinician is listed as Daryn Johansen. If you have any questions after today's visit, please call [phone].

## 2018-04-04 NOTE — PATIENT INSTRUCTIONS
Strep Throat: Care Instructions  Your Care Instructions    Strep throat is a bacterial infection that causes sudden, severe sore throat and fever. Strep throat, which is caused by bacteria called streptococcus, is treated with antibiotics. Sometimes a strep test is necessary to tell if the sore throat is caused by strep bacteria. Treatment can help ease symptoms and may prevent future problems. Follow-up care is a key part of your treatment and safety. Be sure to make and go to all appointments, and call your doctor if you are having problems. It's also a good idea to know your test results and keep a list of the medicines you take. How can you care for yourself at home? · Take your antibiotics as directed. Do not stop taking them just because you feel better. You need to take the full course of antibiotics. · Strep throat can spread to others until 24 hours after you begin taking antibiotics. During this time, you should avoid contact with other people at work or home, especially infants and children. Do not sneeze or cough on others, and wash your hands often. Keep your drinking glass and eating utensils separate from those of others, and wash these items well in hot, soapy water. · Gargle with warm salt water at least once each hour to help reduce swelling and make your throat feel better. Use 1 teaspoon of salt mixed in 8 fluid ounces of warm water. · Take an over-the-counter pain medication, such as acetaminophen (Tylenol), ibuprofen (Advil, Motrin), or naproxen (Aleve). Read and follow all instructions on the label. · Try an over-the-counter anesthetic throat spray or throat lozenges, which may help relieve throat pain. · Drink plenty of fluids. Fluids may help soothe an irritated throat. Hot fluids, such as tea or soup, may help your throat feel better. · Eat soft solids and drink plenty of clear liquids.  Flavored ice pops, ice cream, scrambled eggs, sherbet, and gelatin dessert (such as Jell-O) may also soothe the throat. · Get lots of rest.  · Do not smoke, and avoid secondhand smoke. If you need help quitting, talk to your doctor about stop-smoking programs and medicines. These can increase your chances of quitting for good. · Use a vaporizer or humidifier to add moisture to the air in your bedroom. Follow the directions for cleaning the machine. When should you call for help? Call your doctor now or seek immediate medical care if:  ? · You have a new or higher fever. ? · You have a fever with a stiff neck or severe headache. ? · You have new or worse trouble swallowing. ? · Your sore throat gets much worse on one side. ? · Your pain becomes much worse on one side of your throat. ? Watch closely for changes in your health, and be sure to contact your doctor if:  ? · You are not getting better after 2 days (48 hours). ? · You do not get better as expected. Where can you learn more? Go to http://yue-oswaldo.info/. Enter K625 in the search box to learn more about \"Strep Throat: Care Instructions. \"  Current as of: May 12, 2017  Content Version: 11.4  © 3962-3331 [employer]. Care instructions adapted under license by [email or web address] (which disclaims liability or warranty for this information). If you have questions about a medical condition or this instruction, always ask your healthcare professional. [address] any warranty or liability for your use of this information.

## 2018-04-04 NOTE — PROGRESS NOTES
HISTORY OF PRESENT ILLNESS  Dana Gonzalez is a 27 y.o. female. HPI Comments: Acute care visit with c/o sore throat and ear ache x 3 days. Denies any fever or chills. She reports trouble swallowing and headache. Possible exposure to strep. Has tried goody powder at home and zyrtec with no relief. Sore Throat    The history is provided by the patient. This is a new problem. The current episode started more than 2 days ago. The problem has not changed since onset. There has been no fever. Associated symptoms include ear pain and trouble swallowing. Pertinent negatives include no congestion, no ear discharge and no stridor. She has had no exposure to strep. She has tried NSAIDs for the symptoms. Ear Pain   The history is provided by the patient. This is a new problem. The current episode started 2 days ago. The problem occurs daily. The problem has not changed since onset. She has tried nothing for the symptoms. Review of Systems   Constitutional: Negative. HENT: Positive for ear pain, sore throat and trouble swallowing. Negative for congestion and ear discharge. Eyes: Negative. Respiratory: Negative. Negative for stridor. Genitourinary: Negative. Musculoskeletal: Negative. Neurological: Negative. Past Medical History:   Diagnosis Date    Asthma     Gallbladder polyp 4/12/2011    Migraine      Past Surgical History:   Procedure Laterality Date    HX ACL RECONSTRUCTION  7-2010    Dr Huynh Fake Prescriptions on File Prior to Visit   Medication Sig Dispense Refill    hydrocortisone (CORTAID) 1 % topical cream Apply  to affected area two (2) times daily as needed for Skin Irritation. 85.2 g 3    fluticasone (FLONASE) 50 mcg/actuation nasal spray 2 Sprays by Both Nostrils route daily. Indications: ALLERGIC RHINITIS (Patient taking differently: 2 Sprays by Both Nostrils route as needed.  Indications: ALLERGIC RHINITIS) 3 Bottle 3    Cetirizine (ZYRTEC) 10 mg cap Take  by mouth.  levonorgestrel (MIRENA) 20 mcg/24 hour (5 years) IUD 1 Each by IntraUTERine route once. No current facility-administered medications on file prior to visit. Allergies and Intolerances: Allergies   Allergen Reactions    Tylox [Oxycodone-Acetaminophen] Itching       Family History:   Family History   Problem Relation Age of Onset    Asthma Mother     Thyroid Disease Mother     Diabetes Father     Hypertension Father     Headache Father     Migraines Father     Bipolar Disorder Father     Psychiatric Disorder Father     Diabetes Paternal Grandmother     Alcohol abuse Paternal Grandfather        Social History:   She  reports that she has never smoked. She has never used smokeless tobacco. She  reports that she drinks alcohol. Vitals:   Visit Vitals    /78    Pulse 90    Temp 98.4 °F (36.9 °C)    Resp 14    Ht 5' 5\" (1.651 m)    Wt 166 lb (75.3 kg)    SpO2 98%    BMI 27.62 kg/m2     Body surface area is 1.86 meters squared. Recent Results (from the past 12 hour(s))   AMB POC RAPID STREP A    Collection Time: 04/04/18  9:00 AM   Result Value Ref Range    VALID INTERNAL CONTROL POC Yes     Group A Strep Ag Positive Negative       Physical Exam   Constitutional: She is oriented to person, place, and time. She appears well-developed and well-nourished. HENT:   Head: Atraumatic. Right Ear: External ear normal.   Left Ear: External ear normal.   Nose: Nose normal.   Mouth/Throat: Posterior oropharyngeal erythema present. No oropharyngeal exudate or posterior oropharyngeal edema. Cardiovascular: Normal rate. Pulmonary/Chest: Effort normal and breath sounds normal.   Neurological: She is alert and oriented to person, place, and time. Psychiatric: She has a normal mood and affect. Her behavior is normal.   Nursing note and vitals reviewed. ASSESSMENT and PLAN    ICD-10-CM ICD-9-CM    1.  Sore throat J02.9 462 AMB POC RAPID STREP A   2. Strep sore throat J02.0 034.0 azithromycin (ZITHROMAX) 250 mg tablet   3. Otalgia, bilateral H92.03 388.70    4. Candida vaginitis- empirical treatment, pt requested. B37.3 112.1 fluconazole (DIFLUCAN) 150 mg tablet     Follow-up Disposition:  Return if symptoms worsen or fail to improve.  lab results and schedule of future lab studies reviewed with patient  reviewed medications and side effects in detail    - Alarm signals discussed. ER precautions  - Plan of care reviewed with patient. Understanding verbalized and they are in agreement with plan of care.

## 2018-07-02 ENCOUNTER — OFFICE VISIT (OUTPATIENT)
Dept: SURGERY | Age: 33
End: 2018-07-02

## 2018-07-02 VITALS
DIASTOLIC BLOOD PRESSURE: 68 MMHG | BODY MASS INDEX: 27.82 KG/M2 | HEIGHT: 65 IN | SYSTOLIC BLOOD PRESSURE: 102 MMHG | HEART RATE: 73 BPM | TEMPERATURE: 98.2 F | WEIGHT: 167 LBS

## 2018-07-02 DIAGNOSIS — K43.9 VENTRAL HERNIA WITHOUT OBSTRUCTION OR GANGRENE: Primary | ICD-10-CM

## 2018-07-02 NOTE — PROGRESS NOTES
1. Have you been to the ER, urgent care clinic since your last visit? Hospitalized since your last visit? No    2. Have you seen or consulted any other health care providers outside of the [address] [place] [address] since your last visit? Include any pap smears or colon screening.  No

## 2018-07-02 NOTE — PROGRESS NOTES
General Surgery Consult      Sherif Hart  Admit date: (Not on file)    MRN: [id number]     : 1985     Age: 27 y.o. Attending Physician: Kaitlyn Downey MD [facility]      History of Present Illness:     Sherif Hart is a 27 y.o. female was referred  for evaluation of a ventral hernia. The patient has been noticing a bulge just above the umbilicus for about 6 months. She denies any pain. But has a feeling of mild discomfort. He said that the bulge is reducible by her pushing it back. She even let her  and 2 kids push on the bulge to reduce it. She denies any nausea or vomiting. She denies any change in bowel habits. The patient did not have any imaging done for evaluation of the hernia. Patient Active Problem List    Diagnosis Date Noted    Migraine without aura and without status migrainosus, not intractable 2016    Perennial allergic rhinitis 2016    Gallbladder polyp 2011     Past Medical History:   Diagnosis Date    Asthma     Gallbladder polyp 2011    Migraine       Past Surgical History:   Procedure Laterality Date    HX ACL RECONSTRUCTION      Dr Maryuri Malin History   Substance Use Topics    Smoking status: Never Smoker    Smokeless tobacco: Never Used    Alcohol use Yes      Comment: socially      History   Smoking Status    Never Smoker   Smokeless Tobacco    Never Used     Family History   Problem Relation Age of Onset    Asthma Mother     Thyroid Disease Mother     Diabetes Father     Hypertension Father     Headache Father     Migraines Father     Bipolar Disorder Father     Psychiatric Disorder Father     Diabetes Paternal Grandmother     Alcohol abuse Paternal Grandfather       Current Outpatient Prescriptions   Medication Sig    hydrocortisone (CORTAID) 1 % topical cream Apply  to affected area two (2) times daily as needed for Skin Irritation.     Cetirizine (ZYRTEC) 10 mg cap Take  by mouth.  levonorgestrel (MIRENA) 20 mcg/24 hour (5 years) IUD 1 Each by IntraUTERine route once.  fluticasone (FLONASE) 50 mcg/actuation nasal spray 2 Sprays by Both Nostrils route daily. Indications: ALLERGIC RHINITIS (Patient taking differently: 2 Sprays by Both Nostrils route as needed. Indications: ALLERGIC RHINITIS)     No current facility-administered medications for this visit. Allergies   Allergen Reactions    Tylox [Oxycodone-Acetaminophen] Itching        Review of Systems:  Pertinent items are noted in the History of Present Illness. Objective:     Visit Vitals    /68    Pulse 73    Temp 98.2 °F (36.8 °C)    Ht 5' 5\" (1.651 m)    Wt 75.8 kg (167 lb)    BMI 27.79 kg/m2       Physical Exam:      General:  in no apparent distress, alert, oriented times 3 and afebrile   Eyes:  conjunctivae and sclerae normal, pupils equal, round, reactive to light   Throat & Neck: no erythema or exudates noted and neck supple and symmetrical; no palpable masses   Lungs:   clear to auscultation bilaterally   Heart:  Regular rate and rhythm   Abdomen:   flat, soft, nontender, nondistended, no masses or organomegaly. There is some supraumbilical fullness but no clear evidence of a defect/hernia.    Extremities: extremities normal, atraumatic, no cyanosis or edema   Skin: Normal.       Imaging and Lab Review:     CBC:   Lab Results   Component Value Date/Time    WBC 19.0 (H) 10/31/2012 06:25 AM    RBC 3.57 (L) 10/31/2012 06:25 AM    HGB 8.6 (L) 11/01/2012 06:10 AM    HCT 25.1 (L) 11/01/2012 06:10 AM    PLATELET 86 (L) 99/32/5064 06:10 AM     BMP:   Lab Results   Component Value Date/Time    Glucose 86 04/17/2011 02:40 PM    Sodium 139 04/17/2011 02:40 PM    Potassium 3.6 04/17/2011 02:40 PM    Chloride 100 04/17/2011 02:40 PM    CO2 28 04/17/2011 02:40 PM    BUN 6 (L) 04/17/2011 02:40 PM    Creatinine 1.0 04/17/2011 02:40 PM    Calcium 9.3 04/17/2011 02:40 PM     CMP:  Lab Results   Component Value Date/Time    Glucose 86 04/17/2011 02:40 PM    Sodium 139 04/17/2011 02:40 PM    Potassium 3.6 04/17/2011 02:40 PM    Chloride 100 04/17/2011 02:40 PM    CO2 28 04/17/2011 02:40 PM    BUN 6 (L) 04/17/2011 02:40 PM    Creatinine 1.0 04/17/2011 02:40 PM    Calcium 9.3 04/17/2011 02:40 PM    Anion gap 11 04/17/2011 02:40 PM    BUN/Creatinine ratio 6 (L) 04/17/2011 02:40 PM       No results found for this or any previous visit (from the past 24 hour(s)). images and reports reviewed    Assessment:   Mayra Mcclure is a 27 y.o. female is presenting with a picture of possible ventral hernia. No I could not see a clear defect, the history of present complaint is that because of the hernia  and the patient seems to be very reliable. I Discussed the possibility of incarceration, strangulation, enlargement in size over time, and the risk of emergency surgery in the face of strangulation. I also discussed the use of prosthetic materials (mesh), including the risk of infection. Also discussed the risk of surgery including recurrence and the possible need for reoperation and removal of mesh if used, possibility of postoperative small bowel injury, obstruction or ileus, and the risks of general anesthetic. I explained to the the patient about the robotic hernia repair procedure. I explained to the patient that we can order a CT scan to see if we can see the hernia better, and I also gave her the option of observation versus surgery. The patient decided that she would like to observe it for now and follow up with me as needed.      Plan:     Patient would like to observe the hernia for now  Follow up with me as needed    Please call me if you have any questions (cell phone: [phone])     Signed By: Pio Gleason MD     July 2, 2018

## 2018-08-10 ENCOUNTER — TELEPHONE (OUTPATIENT)
Dept: SURGERY | Age: 33
End: 2018-08-10

## 2018-08-10 NOTE — TELEPHONE ENCOUNTER
Ms. Satinder Mo called stating she wish to proceed with scheduling surgery for ventral hernia repair and also would like to have the CT done that was recommended during her office visit on July 2, 2018. Ms. Satinder Mo states she's going out of town next week but would like to have the surgery as soon as possible. I informed Ms. Satinder Mo I would relay her message to Dr. Deisy Romero and once the order is placed for the CT she will be contacted by our  at [facility] to schedule CT.

## 2022-05-21 ENCOUNTER — APPOINTMENT (OUTPATIENT)
Dept: GENERAL RADIOLOGY | Age: 37
End: 2022-05-21
Attending: EMERGENCY MEDICINE
Payer: COMMERCIAL

## 2022-05-21 ENCOUNTER — HOSPITAL ENCOUNTER (EMERGENCY)
Age: 37
Discharge: HOME | End: 2022-05-21
Attending: EMERGENCY MEDICINE
Payer: COMMERCIAL

## 2022-05-21 VITALS
TEMPERATURE: 98.6 F | DIASTOLIC BLOOD PRESSURE: 78 MMHG | SYSTOLIC BLOOD PRESSURE: 131 MMHG | BODY MASS INDEX: 27.32 KG/M2 | WEIGHT: 170 LBS | RESPIRATION RATE: 17 BRPM | HEIGHT: 66 IN | HEART RATE: 75 BPM | OXYGEN SATURATION: 100 %

## 2022-05-21 DIAGNOSIS — S62.604A CLOSED DISPLACED FRACTURE OF PHALANX OF RIGHT RING FINGER, UNSPECIFIED PHALANX, INITIAL ENCOUNTER: Primary | ICD-10-CM

## 2022-05-21 PROCEDURE — 99283 EMERGENCY DEPT VISIT LOW MDM: CPT

## 2022-05-21 PROCEDURE — 74011250637 HC RX REV CODE- 250/637: Performed by: EMERGENCY MEDICINE

## 2022-05-21 PROCEDURE — 73140 X-RAY EXAM OF FINGER(S): CPT

## 2022-05-21 RX ORDER — HYDROCODONE BITARTRATE AND ACETAMINOPHEN 5; 325 MG/1; MG/1
1 TABLET ORAL
Qty: 10 TABLET | Refills: 0 | Status: SHIPPED | OUTPATIENT
Start: 2022-05-21 | End: 2022-05-24

## 2022-05-21 RX ORDER — HYDROCODONE BITARTRATE AND ACETAMINOPHEN 5; 325 MG/1; MG/1
1 TABLET ORAL
Status: COMPLETED | OUTPATIENT
Start: 2022-05-21 | End: 2022-05-21

## 2022-05-21 RX ADMIN — HYDROCODONE BITARTRATE AND ACETAMINOPHEN 1 TABLET: 5; 325 TABLET ORAL at 13:22

## 2022-05-21 NOTE — ED NOTES
Patient discharged      Discharged instructions were given to patient  Splinting instructions were provided to patient

## 2022-05-21 NOTE — ED PROVIDER NOTES
EMERGENCY DEPARTMENT HISTORY AND PHYSICAL EXAM    12:26 PM  Date: 5/21/2022  Patient Name: Ritu Haas    History of Presenting Illness       History Provided By:     HPI: Ritu Haas is a 43 y.o. female with past medical history as below presents with fourth right finger pain and deformity. Patient tripped and hit finger on the wall earlier today. Patient is right-handed       PCP: Beau, MD Marcos    Past History     Past Medical History:  Past Medical History:   Diagnosis Date    Asthma     Gallbladder polyp 4/12/2011    Migraine        Past Surgical History:  Past Surgical History:   Procedure Laterality Date    HX ACL RECONSTRUCTION  7-2010    Dr Kimberley Salgado         Family History:  Family History   Problem Relation Age of Onset    Asthma Mother     Thyroid Disease Mother     Diabetes Father     Hypertension Father     Headache Father     Migraines Father     Bipolar Disorder Father     Psychiatric Disorder Father     Diabetes Paternal Grandmother     Alcohol abuse Paternal Grandfather        Social History:  Social History     Tobacco Use    Smoking status: Never Smoker    Smokeless tobacco: Never Used   Substance Use Topics    Alcohol use: Yes     Comment: socially    Drug use: No       Allergies: Allergies   Allergen Reactions    Tylox [Oxycodone-Acetaminophen] Itching       Review of Systems   Review of Systems   Constitutional: Negative for activity change, appetite change and chills. HENT: Negative for congestion, ear discharge, ear pain and sore throat. Eyes: Negative for photophobia and pain. Respiratory: Negative for cough and choking. Cardiovascular: Negative for palpitations and leg swelling. Gastrointestinal: Negative for anal bleeding and rectal pain. Endocrine: Negative for polydipsia and polyuria. Genitourinary: Negative for genital sores and urgency. Musculoskeletal: Negative for arthralgias and myalgias.    Neurological: Negative for dizziness, seizures and speech difficulty. Psychiatric/Behavioral: Negative for hallucinations, self-injury and suicidal ideas. Physical Exam     No data found. Physical Exam  Vitals and nursing note reviewed. Constitutional:       Appearance: She is well-developed. HENT:      Head: Normocephalic and atraumatic. Eyes:      General:         Right eye: No discharge. Left eye: No discharge. Cardiovascular:      Rate and Rhythm: Normal rate and regular rhythm. Heart sounds: Normal heart sounds. No murmur heard. Pulmonary:      Effort: Pulmonary effort is normal. No respiratory distress. Breath sounds: Normal breath sounds. No stridor. No wheezing or rales. Chest:      Chest wall: No tenderness. Abdominal:      General: Bowel sounds are normal. There is no distension. Palpations: Abdomen is soft. Tenderness: There is no abdominal tenderness. There is no guarding or rebound. Musculoskeletal:         General: Normal range of motion. Cervical back: Normal range of motion and neck supple. Comments: 4th right finger deformity, slight radial deviation of distal phalanx   Skin:     General: Skin is warm and dry. Neurological:      Mental Status: She is alert and oriented to person, place, and time. Diagnostic Study Results     Labs -  No results found for this or any previous visit (from the past 12 hour(s)). Radiologic Studies -   XR FINGERS RT 2 OR MORE    Result Date: 5/21/2022  Oblique linear lucency at the distal aspect of right fourth middle phalanx, suggesting nondisplaced or incomplete fracture. Thank you for your referral.         Medical Decision Making     ED Course: Progress Notes, Reevaluation, and Consults:    12:26 PM Initial assessment performed. The patients presenting problems have been discussed, and they/their family are in agreement with the care plan formulated and outlined with them.   I have encouraged them to ask questions as they arise throughout their visit. Provider Notes (Medical Decision Making):   Patient presents with right fourth finger deformity  No other injury  Oblique linear lucency at the distal aspect of right fourth middle finger phalanx  Patient given pain medication  Images reviewed with Dr. [facility] orthopedics on-call recommended splinting and follow-up with orthopedics, hand surgery referral given  Given prescription for analgesia  Patient agreed to follow-up with orthopedics                 Vital Signs-Reviewed the patient's vital signs. Reviewed pt's pulse ox reading. Records Reviewed: old medical records  -I am the first provider for this patient.  -I reviewed the vital signs, available nursing notes, past medical history, past surgical history, family history and social history. Current Outpatient Medications   Medication Sig Dispense Refill    HYDROcodone-acetaminophen (Norco) 5-325 mg per tablet Take 1 Tablet by mouth every six (6) hours as needed for Pain for up to 3 days. Max Daily Amount: 4 Tablets. 10 Tablet 0    hydrocortisone (CORTAID) 1 % topical cream Apply  to affected area two (2) times daily as needed for Skin Irritation. 85.2 g 3    fluticasone (FLONASE) 50 mcg/actuation nasal spray 2 Sprays by Both Nostrils route daily. Indications: ALLERGIC RHINITIS (Patient taking differently: 2 Sprays by Both Nostrils route as needed. Indications: ALLERGIC RHINITIS) 3 Bottle 3    Cetirizine (ZYRTEC) 10 mg cap Take  by mouth.  levonorgestrel (MIRENA) 20 mcg/24 hour (5 years) IUD 1 Each by IntraUTERine route once. Clinical Impression     Clinical Impression:   1. Closed displaced fracture of phalanx of right ring finger, unspecified phalanx, initial encounter        Disposition:    Pt has been reexamined. Patient has no new complaints, changes, or physical findings. Care plan outlined and precautions discussed. Results were reviewed with the patient.  All medications were reviewed with the patient; will d/c home with PMD f/u. All of pt's questions and concerns were addressed. Patient was instructed and agrees to follow up with PMD, as well as to return to the ED upon further deterioration. Patient is ready to go home. This note was dictated utilizing voice recognition software which may lead to typographical errors. I apologize in advance if the situation occurs. If questions arise please do not hesitate to contact me or call our department. This note was dictated utilizing voice recognition software which may lead to typographical errors. I apologize in advance if the situation occurs. If questions arise please do not hesitate to contact me or call our department.     Sonya Maki MD  12:26 PM

## 2023-05-19 ENCOUNTER — OFFICE VISIT (OUTPATIENT)
Age: 38
End: 2023-05-19

## 2023-05-19 VITALS
HEIGHT: 66 IN | HEART RATE: 73 BPM | RESPIRATION RATE: 20 BRPM | SYSTOLIC BLOOD PRESSURE: 112 MMHG | DIASTOLIC BLOOD PRESSURE: 70 MMHG | OXYGEN SATURATION: 93 % | BODY MASS INDEX: 27.44 KG/M2 | TEMPERATURE: 97.6 F

## 2023-05-19 DIAGNOSIS — G43.719 INTRACTABLE CHRONIC MIGRAINE WITHOUT AURA AND WITHOUT STATUS MIGRAINOSUS: Primary | ICD-10-CM

## 2023-05-19 RX ORDER — SODIUM SULFACETAMIDE AND SULFUR 80; 40 MG/ML; MG/ML
SOLUTION TOPICAL
COMMUNITY

## 2023-05-19 RX ORDER — BUTALBITAL, ACETAMINOPHEN AND CAFFEINE 300; 40; 50 MG/1; MG/1; MG/1
CAPSULE ORAL
COMMUNITY
Start: 2023-05-09

## 2023-05-19 RX ORDER — GALCANEZUMAB 120 MG/ML
INJECTION, SOLUTION SUBCUTANEOUS
COMMUNITY
Start: 2023-05-09

## 2023-05-19 ASSESSMENT — ENCOUNTER SYMPTOMS
NAUSEA: 1
SHORTNESS OF BREATH: 0
VOMITING: 0
COUGH: 0
BACK PAIN: 0

## 2023-05-19 NOTE — PROGRESS NOTES
Sophy White is a 42 y.o. female . presents for New Patient and Migraine    A 42years old female patient referred here for evaluation of headache. She has been having headaches since she was very young. Frequency getting worse over time. Headaches are lasting longer and are severe. Are bilateral.  Dull aching and sometimes throbbing. 8/10 in severity. Associated with photophobia, osmophobia, phonophobia. Headaches might last up to 4 days. She states she has headaches more than 15 days out of the month. She also might have regular headaches in between. Headaches might decrease her function. Previously, had tried triptans for acute attacks with no significant benefit; might also cause nausea when she takes them. She currently takes Fioricet which helps for acute attacks; takes it about once a week on average. She had tried preventive medications previously including topiramate and amitriptyline. She is currently taking Emgality 120 mg every month [duration decreased by her primary care provider for every 3 weeks]. More than a year ago, she had been on Botox injection which she claims was helping; but subsequently denied by her insurance was paying out-of-pocket. She had tried it for about 2 years. Headache triggers include neck pain, certain foods, and stress. She currently works as an . She sleeps good. Headache might occasionally wakes her up from sleep. Previous imaging studies include CT scan of the head in 2010 which was normal.  Patient do not have any extremity weakness. No upper extremity or lower extremity sensory symptoms. No significant changes in her vision. Review of Systems   Constitutional:  Negative for chills, fever and unexpected weight change. HENT:  Negative for hearing loss and tinnitus. Eyes:  Negative for visual disturbance. Respiratory:  Negative for cough and shortness of breath. Cardiovascular:  Negative for chest pain and leg swelling.

## 2023-07-26 ENCOUNTER — PROCEDURE VISIT (OUTPATIENT)
Age: 38
End: 2023-07-26

## 2023-07-26 VITALS
BODY MASS INDEX: 27.06 KG/M2 | RESPIRATION RATE: 20 BRPM | DIASTOLIC BLOOD PRESSURE: 82 MMHG | TEMPERATURE: 98 F | OXYGEN SATURATION: 99 % | HEART RATE: 67 BPM | SYSTOLIC BLOOD PRESSURE: 118 MMHG | HEIGHT: 66 IN | WEIGHT: 168.4 LBS

## 2023-07-26 DIAGNOSIS — G43.719 INTRACTABLE CHRONIC MIGRAINE WITHOUT AURA AND WITHOUT STATUS MIGRAINOSUS: Primary | ICD-10-CM

## 2023-07-26 ASSESSMENT — ENCOUNTER SYMPTOMS
SHORTNESS OF BREATH: 0
VOMITING: 0
COUGH: 0
BACK PAIN: 0
NAUSEA: 1

## 2023-07-26 NOTE — PROGRESS NOTES
intact  CN: Funduscopy showed clear disc margins; VFF, EOMI, PERRLA, face sensation intact , no facial asymmetry noted, palate elevation symmetric bilat, SS+SCM 5/5 bilat, tongue midline  Motor: no pronator drift, tone normal throughout, strength 5/5 throughout  Sensory: intact to light touch and pinprick bilaterally. Coordination: Intact. DTR: 2+ throughout  Gait: Normal.        No visits with results within 3 Month(s) from this visit. Latest known visit with results is:   Orders Only on 10/31/2012   Component Date Value Ref Range Status    TYPE, ABO & RH, EXTERNAL 04/05/2012 O positive   Final    RPR, EXTERNAL 04/05/2012 Negative   Final    HIV, EXTERNAL 04/05/2012 Non Reactive   Final    Crossmatch expiration date 10/30/2012 11/02/2012   Final    ABO/Rh 10/30/2012 O POS   Final    Antibody Screen 10/30/2012 NEG   Final         Assessment:  1. Intractable chronic migraine without aura and without status migrainosus  - Onabotulinumtoxin A (BOTOX) injection 155 Units     A 42years old female patient with chronic migraine here for follow-up and Botox injection. Continues to have frequent headaches [more than 15 days a month]. Sometimes, headaches might last for a long time; up to a week. Fioricet helps. Currently on Emgality 120 mg subcu every month. She will continue with that. She will have her first Botox injection today in this clinic. Had a good response for Botox in the past.  Discussed nonmedical options. We will see her again in 3 months time. PLEAS3 NOTE:   This document has been produced using voice recognition software. Unrecognized errors in transcription may be present. Botox Procedure     Indications: Chronic migraine     Procedure: The medication was injected as follows: Discussed the procedure with the patient including side effects. Informed consent was obtained. Patient and procedure confirmed.  155 units of Botox was injected at 31 sites, 5 units at each site(corrugators,

## 2023-10-25 ENCOUNTER — PROCEDURE VISIT (OUTPATIENT)
Age: 38
End: 2023-10-25

## 2023-10-25 VITALS
SYSTOLIC BLOOD PRESSURE: 126 MMHG | TEMPERATURE: 99.1 F | WEIGHT: 166.6 LBS | RESPIRATION RATE: 20 BRPM | DIASTOLIC BLOOD PRESSURE: 88 MMHG | OXYGEN SATURATION: 100 % | HEIGHT: 66 IN | HEART RATE: 77 BPM | BODY MASS INDEX: 26.78 KG/M2

## 2023-10-25 DIAGNOSIS — G43.719 INTRACTABLE CHRONIC MIGRAINE WITHOUT AURA AND WITHOUT STATUS MIGRAINOSUS: Primary | ICD-10-CM

## 2023-10-25 ASSESSMENT — ENCOUNTER SYMPTOMS
COUGH: 0
NAUSEA: 1
VOMITING: 0
BACK PAIN: 0
SHORTNESS OF BREATH: 0

## 2023-10-25 NOTE — PROGRESS NOTES
The Neva Monroy is a 42 y.o. female . presents for Procedure (Botox)    A 42years old female patient here for follow-up of headache and Botox injection. Last seen in the clinic in July 2023 for her  Botox injection. She is on Emgality 120 mg subcu every month. After the Botox injection, headache frequency has significantly decreased. On average, she has 2 migraine-like headaches per month. But she might have regular headaches in between. For the first 1 to 2 days after the Botox, did not have any headaches. Over the past 2 weeks, she is getting more headaches. She is following at the spine/orthopedics. For her back pain, getting trigger point injections. Did not have any major reactions from the Botox. From initial encounter:  A 42years old female patient referred here for evaluation of headache. She has been having headaches since she was very young. Frequency getting worse over time. Headaches are lasting longer and are severe. Are bilateral.  Dull aching and sometimes throbbing. 8/10 in severity. Associated with photophobia, osmophobia, phonophobia. Headaches might last up to 4 days. She states she has headaches more than 15 days out of the month. She also might have regular headaches in between. Headaches might decrease her function. Previously, had tried triptans for acute attacks with no significant benefit; might also cause nausea when she takes them. She currently takes Fioricet which helps for acute attacks; takes it about once a week on average. She had tried preventive medications previously including topiramate and amitriptyline. She is currently taking Emgality 120 mg every month [duration decreased by her primary care provider for every 3 weeks]. More than a year ago, she had been on Botox injection which she claims was helping; but subsequently denied by her insurance was paying out-of-pocket. She had tried it for about 2 years.   Headache triggers include neck

## 2024-03-06 ENCOUNTER — PROCEDURE VISIT (OUTPATIENT)
Age: 39
End: 2024-03-06
Payer: COMMERCIAL

## 2024-03-06 VITALS
DIASTOLIC BLOOD PRESSURE: 62 MMHG | BODY MASS INDEX: 27.93 KG/M2 | SYSTOLIC BLOOD PRESSURE: 118 MMHG | RESPIRATION RATE: 18 BRPM | HEART RATE: 67 BPM | OXYGEN SATURATION: 99 % | WEIGHT: 173.8 LBS | HEIGHT: 66 IN

## 2024-03-06 DIAGNOSIS — G43.719 INTRACTABLE CHRONIC MIGRAINE WITHOUT AURA AND WITHOUT STATUS MIGRAINOSUS: Primary | ICD-10-CM

## 2024-03-06 PROCEDURE — 64615 CHEMODENERV MUSC MIGRAINE: CPT | Performed by: STUDENT IN AN ORGANIZED HEALTH CARE EDUCATION/TRAINING PROGRAM

## 2024-03-06 PROCEDURE — 99214 OFFICE O/P EST MOD 30 MIN: CPT | Performed by: STUDENT IN AN ORGANIZED HEALTH CARE EDUCATION/TRAINING PROGRAM

## 2024-03-06 RX ORDER — GALCANEZUMAB 120 MG/ML
120 INJECTION, SOLUTION SUBCUTANEOUS
Qty: 1 ML | Refills: 5 | Status: SHIPPED | OUTPATIENT
Start: 2024-03-06

## 2024-03-06 RX ORDER — BUTALBITAL, ACETAMINOPHEN AND CAFFEINE 300; 40; 50 MG/1; MG/1; MG/1
1 CAPSULE ORAL PRN
Qty: 30 CAPSULE | Refills: 3 | Status: SHIPPED | OUTPATIENT
Start: 2024-03-06

## 2024-03-06 ASSESSMENT — ENCOUNTER SYMPTOMS
SHORTNESS OF BREATH: 0
BACK PAIN: 1
COUGH: 0
VOMITING: 0
NAUSEA: 1

## 2024-03-06 NOTE — PROGRESS NOTES
The Yesica Cervantes is a 38 y.o. female .presents for Procedure (Botox)    A 38 years old female patient here for follow-up of headache and Botox injection.  In addition to Botox, she is on Emgality 120 mg subcu every month.  Last seen in the clinic in October 2023.  Unable to get her Botox injection about a month ago.  Has noticed worsening headache over the past 1 month; has more than 15 headache days.  If she does not take Fioricet, headache might last for up to 4 days.  But, it lasts for about an hour if she takes the Fioricet early.  Headaches are severe and associated with nausea, photophobia and phonophobia.  No vomiting.    From initial encounter:  A 37 years old female patient referred here for evaluation of headache.  She has been having headaches since she was very young.  Frequency getting worse over time.  Headaches are lasting longer and are severe.  Are bilateral.  Dull aching and sometimes throbbing.  8/10 in severity.  Associated with photophobia, osmophobia, phonophobia.  Headaches might last up to 4 days.  She states she has headaches more than 15 days out of the month.  She also might have regular headaches in between.  Headaches might decrease her function.  Previously, had tried triptans for acute attacks with no significant benefit; might also cause nausea when she takes them.  She currently takes Fioricet which helps for acute attacks; takes it about once a week on average.  She had tried preventive medications previously including topiramate and amitriptyline.  She is currently taking Emgality 120 mg every month [duration decreased by her primary care provider for every 3 weeks].  More than a year ago, she had been on Botox injection which she claims was helping; but subsequently denied by her insurance was paying out-of-pocket.  She had tried it for about 2 years.  Headache triggers include neck pain, certain foods, and stress.  She currently works as an .  She sleeps good.

## 2024-09-11 ENCOUNTER — OFFICE VISIT (OUTPATIENT)
Age: 39
End: 2024-09-11

## 2024-09-11 VITALS
WEIGHT: 173.8 LBS | DIASTOLIC BLOOD PRESSURE: 68 MMHG | TEMPERATURE: 98.6 F | OXYGEN SATURATION: 99 % | HEIGHT: 67 IN | BODY MASS INDEX: 27.28 KG/M2 | SYSTOLIC BLOOD PRESSURE: 124 MMHG | HEART RATE: 78 BPM

## 2024-09-11 DIAGNOSIS — G43.719 INTRACTABLE CHRONIC MIGRAINE WITHOUT AURA AND WITHOUT STATUS MIGRAINOSUS: Primary | ICD-10-CM

## 2024-09-11 RX ORDER — ONDANSETRON 4 MG/1
4 TABLET, ORALLY DISINTEGRATING ORAL 3 TIMES DAILY PRN
Qty: 20 TABLET | Refills: 3 | Status: SHIPPED | OUTPATIENT
Start: 2024-09-11

## 2024-09-11 RX ORDER — GALCANEZUMAB 120 MG/ML
120 INJECTION, SOLUTION SUBCUTANEOUS
Qty: 1 ADJUSTABLE DOSE PRE-FILLED PEN SYRINGE | Refills: 4 | Status: SHIPPED | OUTPATIENT
Start: 2024-09-11

## 2024-09-11 RX ORDER — BUTALBITAL, ACETAMINOPHEN AND CAFFEINE 300; 40; 50 MG/1; MG/1; MG/1
1 CAPSULE ORAL PRN
Qty: 30 CAPSULE | Refills: 3 | Status: SHIPPED | OUTPATIENT
Start: 2024-09-11

## 2024-09-11 ASSESSMENT — ENCOUNTER SYMPTOMS
COUGH: 0
NAUSEA: 1
SHORTNESS OF BREATH: 0
VOMITING: 0
BACK PAIN: 1

## 2024-09-16 ENCOUNTER — HOSPITAL ENCOUNTER (EMERGENCY)
Facility: HOSPITAL | Age: 39
Discharge: HOME | End: 2024-09-16
Payer: COMMERCIAL

## 2024-09-16 VITALS
OXYGEN SATURATION: 100 % | TEMPERATURE: 98.4 F | HEART RATE: 81 BPM | WEIGHT: 177 LBS | HEIGHT: 67 IN | SYSTOLIC BLOOD PRESSURE: 123 MMHG | BODY MASS INDEX: 27.78 KG/M2 | RESPIRATION RATE: 16 BRPM | DIASTOLIC BLOOD PRESSURE: 80 MMHG

## 2024-09-16 DIAGNOSIS — T30.0 BURN: Primary | ICD-10-CM

## 2024-09-16 PROCEDURE — 99284 EMERGENCY DEPT VISIT MOD MDM: CPT

## 2024-09-16 PROCEDURE — 96372 THER/PROPH/DIAG INJ SC/IM: CPT

## 2024-09-16 PROCEDURE — 6360000002 HC RX W HCPCS: Performed by: HEALTH CARE PROVIDER

## 2024-09-16 PROCEDURE — 90471 IMMUNIZATION ADMIN: CPT | Performed by: HEALTH CARE PROVIDER

## 2024-09-16 PROCEDURE — 90715 TDAP VACCINE 7 YRS/> IM: CPT | Performed by: HEALTH CARE PROVIDER

## 2024-09-16 RX ORDER — KETOROLAC TROMETHAMINE 15 MG/ML
15 INJECTION, SOLUTION INTRAMUSCULAR; INTRAVENOUS ONCE
Status: COMPLETED | OUTPATIENT
Start: 2024-09-16 | End: 2024-09-16

## 2024-09-16 RX ORDER — IBUPROFEN 600 MG/1
600 TABLET, FILM COATED ORAL 3 TIMES DAILY PRN
Qty: 30 TABLET | Refills: 0 | Status: SHIPPED | OUTPATIENT
Start: 2024-09-16

## 2024-09-16 RX ORDER — ACETAMINOPHEN 500 MG
1000 TABLET ORAL EVERY 6 HOURS PRN
Qty: 30 TABLET | Refills: 0 | Status: SHIPPED | OUTPATIENT
Start: 2024-09-16

## 2024-09-16 RX ADMIN — TETANUS TOXOID, REDUCED DIPHTHERIA TOXOID AND ACELLULAR PERTUSSIS VACCINE, ADSORBED 0.5 ML: 5; 2.5; 8; 8; 2.5 SUSPENSION INTRAMUSCULAR at 14:53

## 2024-09-16 RX ADMIN — KETOROLAC TROMETHAMINE 15 MG: 15 INJECTION, SOLUTION INTRAMUSCULAR; INTRAVENOUS at 14:52

## 2024-09-16 ASSESSMENT — PAIN - FUNCTIONAL ASSESSMENT: PAIN_FUNCTIONAL_ASSESSMENT: 0-10

## 2024-09-16 ASSESSMENT — PAIN SCALES - GENERAL: PAINLEVEL_OUTOF10: 10

## 2024-09-17 ASSESSMENT — ENCOUNTER SYMPTOMS
VOMITING: 0
DIARRHEA: 0
ABDOMINAL PAIN: 0
CHEST TIGHTNESS: 0
COUGH: 0
SHORTNESS OF BREATH: 0
NAUSEA: 0

## 2024-12-06 ENCOUNTER — TELEPHONE (OUTPATIENT)
Age: 39
End: 2024-12-06

## 2024-12-06 NOTE — TELEPHONE ENCOUNTER
Call placed to Insurance to obtain authorization for BOTOX.  Was informed patient does not need authorization reference number [id number].

## 2024-12-11 ENCOUNTER — OFFICE VISIT (OUTPATIENT)
Age: 39
End: 2024-12-11

## 2024-12-11 VITALS
DIASTOLIC BLOOD PRESSURE: 63 MMHG | TEMPERATURE: 98.3 F | OXYGEN SATURATION: 100 % | WEIGHT: 176 LBS | SYSTOLIC BLOOD PRESSURE: 115 MMHG | BODY MASS INDEX: 28.28 KG/M2 | HEART RATE: 74 BPM | HEIGHT: 66 IN | RESPIRATION RATE: 16 BRPM

## 2024-12-11 DIAGNOSIS — G43.719 INTRACTABLE CHRONIC MIGRAINE WITHOUT AURA AND WITHOUT STATUS MIGRAINOSUS: ICD-10-CM

## 2024-12-11 RX ORDER — GALCANEZUMAB 120 MG/ML
120 INJECTION, SOLUTION SUBCUTANEOUS
Qty: 1 ADJUSTABLE DOSE PRE-FILLED PEN SYRINGE | Refills: 4 | Status: SHIPPED | OUTPATIENT
Start: 2024-12-11

## 2024-12-11 RX ORDER — BUTALBITAL, ACETAMINOPHEN AND CAFFEINE 300; 40; 50 MG/1; MG/1; MG/1
1 CAPSULE ORAL PRN
Qty: 30 CAPSULE | Refills: 3 | Status: SHIPPED | OUTPATIENT
Start: 2024-12-11

## 2024-12-11 ASSESSMENT — ENCOUNTER SYMPTOMS
NAUSEA: 1
VOMITING: 0
SHORTNESS OF BREATH: 0
BACK PAIN: 0
COUGH: 0

## 2024-12-11 ASSESSMENT — PATIENT HEALTH QUESTIONNAIRE - PHQ9
SUM OF ALL RESPONSES TO PHQ QUESTIONS 1-9: 0
1. LITTLE INTEREST OR PLEASURE IN DOING THINGS: NOT AT ALL
SUM OF ALL RESPONSES TO PHQ9 QUESTIONS 1 & 2: 0
2. FEELING DOWN, DEPRESSED OR HOPELESS: NOT AT ALL

## 2024-12-11 NOTE — PROGRESS NOTES
Yesica Cervantes is a 38 y.o. female (: 1985) presenting to address:    Chief Complaint   Patient presents with    Procedure     Botox       Vitals:    24 0809   BP: 115/63   Pulse: 74   Resp: 16   Temp: 98.3 °F (36.8 °C)   SpO2: 100%       Coordination of Care Questionaire:     \"Have you been to the ER, urgent care clinic since your last visit?  Hospitalized since your last visit?\"    No     “Have you seen or consulted any other health care providers outside our system since your last visit?”    No

## 2024-12-11 NOTE — PROGRESS NOTES
The Yesica Cervantes is a 38 y.o. female .presents for Procedure (Botox)  A 38 years old female patient here for follow-up of headache and Botox injection.  In addition to Botox, she is on Emgality 120 mg subcu every month.  Last seen in the clinic in September 2024.  She has been doing good until about 2 weeks ago; has worsening headache.  Over the past 2 weeks, has 5 days of headache.  Might be severe; but she tends to continue to function.  Associated with photophobia, phonophobia, and osmophobia.  Has nausea but no vomiting.  Takes Fioricet which is helping.      From initial encounter:  A 37 years old female patient referred here for evaluation of headache.  She has been having headaches since she was very young.  Frequency getting worse over time.  Headaches are lasting longer and are severe.  Are bilateral.  Dull aching and sometimes throbbing.  8/10 in severity.  Associated with photophobia, osmophobia, phonophobia.  Headaches might last up to 4 days.  She states she has headaches more than 15 days out of the month.  She also might have regular headaches in between.  Headaches might decrease her function.  Previously, had tried triptans for acute attacks with no significant benefit; might also cause nausea when she takes them.  She currently takes Fioricet which helps for acute attacks; takes it about once a week on average.  She had tried preventive medications previously including topiramate and amitriptyline.  She is currently taking Emgality 120 mg every month [duration decreased by her primary care provider for every 3 weeks].  More than a year ago, she had been on Botox injection which she claims was helping; but subsequently denied by her insurance was paying out-of-pocket.  She had tried it for about 2 years.  Headache triggers include neck pain, certain foods, and stress.  She currently works as an .  She sleeps good.  Headache might occasionally wakes her up from sleep.  Previous imaging

## 2025-03-11 ENCOUNTER — TELEPHONE (OUTPATIENT)
Age: 40
End: 2025-03-11

## 2025-03-11 DIAGNOSIS — G43.719 INTRACTABLE CHRONIC MIGRAINE WITHOUT AURA AND WITHOUT STATUS MIGRAINOSUS: ICD-10-CM

## 2025-03-11 RX ORDER — BUTALBITAL, ACETAMINOPHEN AND CAFFEINE 300; 40; 50 MG/1; MG/1; MG/1
1 CAPSULE ORAL PRN
Qty: 30 CAPSULE | Refills: 3 | Status: SHIPPED | OUTPATIENT
Start: 2025-03-11

## 2025-05-21 ENCOUNTER — OFFICE VISIT (OUTPATIENT)
Age: 40
End: 2025-05-21

## 2025-05-21 VITALS
BODY MASS INDEX: 25.55 KG/M2 | SYSTOLIC BLOOD PRESSURE: 121 MMHG | HEIGHT: 66 IN | DIASTOLIC BLOOD PRESSURE: 89 MMHG | WEIGHT: 159 LBS | OXYGEN SATURATION: 100 % | HEART RATE: 51 BPM

## 2025-05-21 DIAGNOSIS — G43.719 INTRACTABLE CHRONIC MIGRAINE WITHOUT AURA AND WITHOUT STATUS MIGRAINOSUS: Primary | ICD-10-CM

## 2025-05-21 RX ORDER — LORAZEPAM 1 MG/1
1 TABLET ORAL PRN
COMMUNITY

## 2025-05-21 ASSESSMENT — ENCOUNTER SYMPTOMS
NAUSEA: 1
SHORTNESS OF BREATH: 0
BACK PAIN: 1
VOMITING: 0
COUGH: 0

## 2025-05-21 NOTE — PROGRESS NOTES
Eyes:      Extraocular Movements: Extraocular movements intact.      Pupils: Pupils are equal, round, and reactive to light.   Pulmonary:      Effort: Pulmonary effort is normal. No respiratory distress.   Musculoskeletal:         General: Normal range of motion.      Cervical back: Normal range of motion and neck supple.      Right lower leg: No edema.      Left lower leg: No edema.   Neurological:      Mental Status: She is alert.      Comments: Mental status: Awake, alert, oriented , follows simple and complex commands, no neglect, no extinction.  Speech and languge: fluent, coherent,  and comprehension intact  CN: VFF, EOMI, PERRLA, face sensation intact , no facial asymmetry noted, palate elevation symmetric bilat, SS+SCM 5/5 bilat, tongue midline  Motor: no pronator drift, tone normal throughout, strength 5/5 throughout  Sensory: intact to light touch and pinprick bilaterally.  Coordination: Intact.  DTR: 2+ throughout  Gait: Normal.          No visits with results within 3 Month(s) from this visit.   Latest known visit with results is:   Orders Only on 10/31/2012   Component Date Value Ref Range Status    TYPE, ABO & RH, EXTERNAL 04/05/2012 O positive   Final    RPR, EXTERNAL 04/05/2012 Negative   Final    HIV, EXTERNAL 04/05/2012 Non Reactive   Final    Crossmatch expiration date 10/30/2012 11/02/2012   Final    ABO/Rh 10/30/2012 O POS   Final    Antibody Screen 10/30/2012 NEG   Final         Assessment:  1. Intractable chronic migraine without aura and without status migrainosus  - Onabotulinumtoxin A (BOTOX) injection 155 Units  - CHEMODERVATE FACIAL/TRIGEM/CERV MUSC MIGRAINE ([id number])  Getting more headaches since March 2025; after missing Botox injection.  Getting about 5 headaches per week.  Attacks are severe; but continues to function.  She has Fioricet for acute attacks; discussed the need to limit the use to not more than 2 days a week.  Has Zofran for nausea.  Will continue with Emgality 120 mg

## 2025-08-20 ENCOUNTER — OFFICE VISIT (OUTPATIENT)
Age: 40
End: 2025-08-20
Payer: COMMERCIAL

## 2025-08-20 VITALS
SYSTOLIC BLOOD PRESSURE: 96 MMHG | HEART RATE: 74 BPM | HEIGHT: 66 IN | OXYGEN SATURATION: 100 % | BODY MASS INDEX: 25.71 KG/M2 | DIASTOLIC BLOOD PRESSURE: 58 MMHG | WEIGHT: 160 LBS | TEMPERATURE: 97.8 F

## 2025-08-20 DIAGNOSIS — G43.719 INTRACTABLE CHRONIC MIGRAINE WITHOUT AURA AND WITHOUT STATUS MIGRAINOSUS: Primary | ICD-10-CM

## 2025-08-20 PROCEDURE — 64615 CHEMODENERV MUSC MIGRAINE: CPT | Performed by: STUDENT IN AN ORGANIZED HEALTH CARE EDUCATION/TRAINING PROGRAM

## 2025-08-20 PROCEDURE — 99214 OFFICE O/P EST MOD 30 MIN: CPT | Performed by: STUDENT IN AN ORGANIZED HEALTH CARE EDUCATION/TRAINING PROGRAM

## 2025-08-20 RX ORDER — GALCANEZUMAB 120 MG/ML
120 INJECTION, SOLUTION SUBCUTANEOUS
Qty: 1 ADJUSTABLE DOSE PRE-FILLED PEN SYRINGE | Refills: 4 | Status: SHIPPED | OUTPATIENT
Start: 2025-08-20

## 2025-08-20 RX ORDER — BUTALBITAL, ACETAMINOPHEN AND CAFFEINE 300; 40; 50 MG/1; MG/1; MG/1
1 CAPSULE ORAL PRN
Qty: 30 CAPSULE | Refills: 3 | Status: SHIPPED | OUTPATIENT
Start: 2025-08-20 | End: 2025-08-20

## 2025-08-20 RX ORDER — GALCANEZUMAB 120 MG/ML
120 INJECTION, SOLUTION SUBCUTANEOUS
Qty: 1 ADJUSTABLE DOSE PRE-FILLED PEN SYRINGE | Refills: 4 | Status: SHIPPED | OUTPATIENT
Start: 2025-08-20 | End: 2025-08-20

## 2025-08-20 RX ORDER — BUTALBITAL, ACETAMINOPHEN AND CAFFEINE 300; 40; 50 MG/1; MG/1; MG/1
1 CAPSULE ORAL PRN
Qty: 30 CAPSULE | Refills: 3 | Status: SHIPPED | OUTPATIENT
Start: 2025-08-20

## 2025-08-20 RX ORDER — ONDANSETRON 4 MG/1
4 TABLET, ORALLY DISINTEGRATING ORAL 3 TIMES DAILY PRN
Qty: 20 TABLET | Refills: 3 | Status: SHIPPED | OUTPATIENT
Start: 2025-08-20

## 2025-08-20 RX ORDER — ONDANSETRON 4 MG/1
4 TABLET, ORALLY DISINTEGRATING ORAL 3 TIMES DAILY PRN
Qty: 20 TABLET | Refills: 3 | Status: SHIPPED | OUTPATIENT
Start: 2025-08-20 | End: 2025-08-20

## 2025-08-20 ASSESSMENT — ENCOUNTER SYMPTOMS
NAUSEA: 1
SHORTNESS OF BREATH: 0
VOMITING: 0
BACK PAIN: 0
COUGH: 0